# Patient Record
Sex: FEMALE | Race: WHITE | URBAN - METROPOLITAN AREA
[De-identification: names, ages, dates, MRNs, and addresses within clinical notes are randomized per-mention and may not be internally consistent; named-entity substitution may affect disease eponyms.]

---

## 2021-03-16 ENCOUNTER — OFFICE VISIT (OUTPATIENT)
Dept: ORTHOPEDIC SURGERY | Age: 66
End: 2021-03-16
Payer: MEDICARE

## 2021-03-16 VITALS — BODY MASS INDEX: 36.37 KG/M2 | HEIGHT: 68 IN | WEIGHT: 240 LBS

## 2021-03-16 DIAGNOSIS — M70.62 TROCHANTERIC BURSITIS OF LEFT HIP: Primary | ICD-10-CM

## 2021-03-16 DIAGNOSIS — M16.12 PRIMARY OSTEOARTHRITIS OF LEFT HIP: ICD-10-CM

## 2021-03-16 PROCEDURE — 1090F PRES/ABSN URINE INCON ASSESS: CPT | Performed by: ORTHOPAEDIC SURGERY

## 2021-03-16 PROCEDURE — G8419 CALC BMI OUT NRM PARAM NOF/U: HCPCS | Performed by: ORTHOPAEDIC SURGERY

## 2021-03-16 PROCEDURE — G8510 SCR DEP NEG, NO PLAN REQD: HCPCS | Performed by: ORTHOPAEDIC SURGERY

## 2021-03-16 PROCEDURE — 1101F PT FALLS ASSESS-DOCD LE1/YR: CPT | Performed by: ORTHOPAEDIC SURGERY

## 2021-03-16 PROCEDURE — G8536 NO DOC ELDER MAL SCRN: HCPCS | Performed by: ORTHOPAEDIC SURGERY

## 2021-03-16 PROCEDURE — G8427 DOCREV CUR MEDS BY ELIG CLIN: HCPCS | Performed by: ORTHOPAEDIC SURGERY

## 2021-03-16 PROCEDURE — 3017F COLORECTAL CA SCREEN DOC REV: CPT | Performed by: ORTHOPAEDIC SURGERY

## 2021-03-16 PROCEDURE — 99203 OFFICE O/P NEW LOW 30 MIN: CPT | Performed by: ORTHOPAEDIC SURGERY

## 2021-03-16 PROCEDURE — G8400 PT W/DXA NO RESULTS DOC: HCPCS | Performed by: ORTHOPAEDIC SURGERY

## 2021-03-16 PROCEDURE — 20611 DRAIN/INJ JOINT/BURSA W/US: CPT | Performed by: ORTHOPAEDIC SURGERY

## 2021-03-16 RX ORDER — METHYLPREDNISOLONE 4 MG/1
TABLET ORAL
Qty: 1 DOSE PACK | Refills: 0 | Status: SHIPPED | OUTPATIENT
Start: 2021-03-16 | End: 2021-09-21

## 2021-03-16 RX ORDER — TRIAMCINOLONE ACETONIDE 40 MG/ML
40 INJECTION, SUSPENSION INTRA-ARTICULAR; INTRAMUSCULAR ONCE
Status: COMPLETED | OUTPATIENT
Start: 2021-03-16 | End: 2021-03-16

## 2021-03-16 RX ORDER — LIDOCAINE HYDROCHLORIDE 10 MG/ML
9 INJECTION INFILTRATION; PERINEURAL ONCE
Status: COMPLETED | OUTPATIENT
Start: 2021-03-16 | End: 2021-03-16

## 2021-03-16 RX ADMIN — TRIAMCINOLONE ACETONIDE 40 MG: 40 INJECTION, SUSPENSION INTRA-ARTICULAR; INTRAMUSCULAR at 11:42

## 2021-03-16 RX ADMIN — LIDOCAINE HYDROCHLORIDE 9 ML: 10 INJECTION INFILTRATION; PERINEURAL at 11:42

## 2021-03-16 NOTE — LETTER
Jem Host 1955  
[id number]  
 
 
3/16/2021 I hereby authorize and direct Ihsan Mendez MD, Sabrina Emerson, and whomever he may designate as his associate to perform upon myself the following procedure: 
 
Injection of: Kenalog, Supartz, Euflexxa, Orthovisc in the Right/Left ____________________. If any unforeseen condition arises in the course of the procedure, I further authorize him and his associated and/or assistant(s) to do whatever he/she deems advisable. The nature, purpose, benefits, risks, side effects, likelihood of achieving goals, and potential problems that might occur during recuperation, risks for not receiving the proposed care, treatment and services and alternatives of the procedure have been fully explained to me by my physician including, but not limited to: 
 
Swelling, joint pain, skin pigment changes, worsening of condition, and failure to improve. I acknowledge that no guarantee or assurance has been made to me as to the results that may be obtained or the likelihood of success. _______________________________________ Signature of patient or authorized representative [employer] [facility] fax: [phone]

## 2021-03-16 NOTE — PATIENT INSTRUCTIONS
Hip Pain: Care Instructions Your Care Instructions Hip pain may be caused by many things, including overuse, a fall, or a twisting movement. Another cause of hip pain is arthritis. Your pain may increase when you stand up, walk, or squat. The pain may come and go or may be constant. Home treatment can help relieve hip pain, swelling, and stiffness. If your pain is ongoing, you may need more tests and treatment. Follow-up care is a key part of your treatment and safety. Be sure to make and go to all appointments, and call your doctor if you are having problems. It's also a good idea to know your test results and keep a list of the medicines you take. How can you care for yourself at home? · Take pain medicines exactly as directed. ? If the doctor gave you a prescription medicine for pain, take it as prescribed. ? If you are not taking a prescription pain medicine, ask your doctor if you can take an over-the-counter medicine. · Rest and protect your hip. Take a break from any activity, including standing or walking, that may cause pain. · Put ice or a cold pack against your hip for 10 to 20 minutes at a time. Try to do this every 1 to 2 hours for the next 3 days (when you are awake) or until the swelling goes down. Put a thin cloth between the ice and your skin. · Sleep on your healthy side with a pillow between your knees, or sleep on your back with pillows under your knees. · If there is no swelling, you can put moist heat, a heating pad, or a warm cloth on your hip. Do gentle stretching exercises to help keep your hip flexible. · Learn how to prevent falls. Have your vision and hearing checked regularly. Wear slippers or shoes with a nonskid sole. · Stay at a healthy weight. · Wear comfortable shoes. When should you call for help? Call 911 anytime you think you may need emergency care.  For example, call if: 
  · You have sudden chest pain and shortness of breath, or you cough up blood.  
  · You are not able to stand or walk or bear weight.  
  · Your buttocks, legs, or feet feel numb or tingly.  
  · Your leg or foot is cool or pale or changes color.  
  · You have severe pain. Call your doctor now or seek immediate medical care if: 
  · You have signs of infection, such as: 
? Increased pain, swelling, warmth, or redness in the hip area. ? Red streaks leading from the hip area. ? Pus draining from the hip area. ? A fever.  
  · You have signs of a blood clot, such as: 
? Pain in your calf, back of the knee, thigh, or groin. ? Redness and swelling in your leg or groin.  
  · You are not able to bend, straighten, or move your leg normally.  
  · You have trouble urinating or having bowel movements. Watch closely for changes in your health, and be sure to contact your doctor if: 
  · You do not get better as expected. Where can you learn more? Go to http://www.gray.com/ Enter I838 in the search box to learn more about \"Hip Pain: Care Instructions. \" Current as of: June 26, 2019               Content Version: 12.6 © 9301-7368 [employer]. Care instructions adapted under license by [employer] (which disclaims liability or warranty for this information). If you have questions about a medical condition or this instruction, always ask your healthcare professional. [place] [address] any warranty or liability for your use of this information.

## 2021-03-16 NOTE — PROGRESS NOTES
Name: Smiley Osei    : 1955     Service Dept: [facility]    Patient's Pharmacies:    [facility] [id number] [id number] [facility] [address]  Phone: [phone] Fax: [phone]       Chief Complaint   Patient presents with    Hip Pain    Leg Pain        Visit Vitals  Ht 5' 8\" (1.727 m)   Wt 240 lb (108.9 kg)   BMI 36.49 kg/m²      No Known Allergies   Current Outpatient Medications   Medication Sig Dispense Refill    methylPREDNISolone (MEDROL DOSEPACK) 4 mg tablet Per dose pack instructions 1 Dose Pack 0      There is no problem list on file for this patient. Family History   Problem Relation Age of Onset    No Known Problems Mother     Cancer Father       Social History     Socioeconomic History    Marital status:      Spouse name: Not on file    Number of children: Not on file    Years of education: Not on file    Highest education level: Not on file   Tobacco Use    Smoking status: Current Every Day Smoker     Packs/day: 1.00     Years: 10.00     Pack years: 10.00    Smokeless tobacco: Never Used   Substance and Sexual Activity    Alcohol use: Not Currently    Drug use: Never    Sexual activity: Not Currently      History reviewed. No pertinent surgical history. Past Medical History:   Diagnosis Date    Asthma     GERD (gastroesophageal reflux disease)     Lung disease         I have reviewed and agree with PFS and ROS and intake form in chart and the record furthermore I have reviewed prior medical record(s) regarding this patients care during this appointment. Review of Systems:   Patient is a pleasant appearing individual, appropriately dressed, well hydrated, well nourished, who is alert, appropriately oriented for age, and in no acute distress with a normal gait and normal affect who does not appear to be in any significant pain. Physical Exam:  Left Hip - Slight decrease range of motion, No crepitation, Grossly neurovascularly intact, Good cap refill, No skin lesion, mild swelling, No gross instability, Some weakness, No groin pain, Point tenderness on the greater trochanter. Right Hip - Normal range of motion, No crepitation, Grossly neurovascularly intact, Good cap refill, No skin lesion, mild swelling, No gross instability, No weakness, No groin pain, No point tenderness on the greater trochanter. Procedure Documentation:    I discussed in detail the risks, benefits and complications of an injection which included but are not limited to infection, skin reactions, hot swollen joint, and anaphylaxis with the patient. The patient verbalized understanding and gave informed consent for the injection. The patient's left hip was prepped using sterile alcohol solution. A sterile needle was inserted into the left hip and the mixture of 9 mL Lidocaine 1%, 1 mL Kenalog 40 mg was injected under sterile technique. The needle was withdrawn and the puncture site sealed with a Band-Aid. Technique: Under sterile conditions a Selvz ultrasound unit with a variable frequency (7.0-14.0 MHz) linear transducer was used to localize the placement of needle into the left hip. Findings: Successful needle placement for hip injection. Final images were taken and saved for permanent record. The patient tolerated the injection well. The patient was instructed to call the office immediately if there is any pain, redness, warmth, fever, or chills. Encounter Diagnoses     ICD-10-CM ICD-9-CM   1. Trochanteric bursitis of left hip  M70.62 726.5   2. Primary osteoarthritis of left hip  M16.12 715.15       HPI:  The patient is here with a chief complaint of left thigh and hip pain, been having it for a while now, progressively getting worse. Pain is 10/10. X-rays are positive for severe OA.     Assessment/Plan:  Plan would be for cortisone injection in left hip for the bursitis and Medrol Dosepak. We will see the patient back in 1 week. If not better, we may have her see a hip specialist.      As part of continued conservative pain management options the patient was advised to utilize Tylenol or OTC NSAIDS as long as it is not medically contraindicated. Return to Office: Follow-up and Dispositions    · Return in about 1 week (around 3/23/2021). Administrations This Visit     lidocaine (XYLOCAINE) 10 mg/mL (1 %) injection 9 mL     Admin Date  03/16/2021 Action  Given Dose  9 mL Route  Other Administered By  Selvin Basurto MD          triamcinolone acetonide (KENALOG-40) 40 mg/mL injection 40 mg     Admin Date  03/16/2021 Action  Given Dose  40 mg Route  Intra artICUlar Administered By  Selvin Basurto MD               Scribed by Melani Trejo MD as dictated by Frances Vallecillo. Madhu Ha MD.  Documentation True and Accepted Ihsan Ha MD

## 2021-09-21 ENCOUNTER — VIRTUAL VISIT (OUTPATIENT)
Dept: FAMILY MEDICINE CLINIC | Age: 66
End: 2021-09-21
Payer: MEDICARE

## 2021-09-21 DIAGNOSIS — J45.909 MODERATE ASTHMA WITHOUT COMPLICATION, UNSPECIFIED WHETHER PERSISTENT: Primary | ICD-10-CM

## 2021-09-21 PROCEDURE — 1090F PRES/ABSN URINE INCON ASSESS: CPT | Performed by: NURSE PRACTITIONER

## 2021-09-21 PROCEDURE — 1101F PT FALLS ASSESS-DOCD LE1/YR: CPT | Performed by: NURSE PRACTITIONER

## 2021-09-21 PROCEDURE — 99213 OFFICE O/P EST LOW 20 MIN: CPT | Performed by: NURSE PRACTITIONER

## 2021-09-21 PROCEDURE — G8536 NO DOC ELDER MAL SCRN: HCPCS | Performed by: NURSE PRACTITIONER

## 2021-09-21 PROCEDURE — G8400 PT W/DXA NO RESULTS DOC: HCPCS | Performed by: NURSE PRACTITIONER

## 2021-09-21 PROCEDURE — G8432 DEP SCR NOT DOC, RNG: HCPCS | Performed by: NURSE PRACTITIONER

## 2021-09-21 PROCEDURE — 3017F COLORECTAL CA SCREEN DOC REV: CPT | Performed by: NURSE PRACTITIONER

## 2021-09-21 PROCEDURE — G8427 DOCREV CUR MEDS BY ELIG CLIN: HCPCS | Performed by: NURSE PRACTITIONER

## 2021-09-21 PROCEDURE — G8417 CALC BMI ABV UP PARAM F/U: HCPCS | Performed by: NURSE PRACTITIONER

## 2021-09-21 RX ORDER — ALBUTEROL SULFATE 90 UG/1
2 AEROSOL, METERED RESPIRATORY (INHALATION)
Qty: 18 G | Refills: 1 | Status: SHIPPED | OUTPATIENT
Start: 2021-09-21 | End: 2021-10-21

## 2021-09-21 NOTE — PROGRESS NOTES
Oscar Villeda presents today for   Chief Complaint   Patient presents with    Medication Refill    Establish Care     patient was on the schedule for in office bronchitis, I went out and spoke to the patient in the waiting area and asked her symptoms and she told me she had to be honest with me that someone she had been around has covid and she had several symptom of covid and was then escorted out and advised to call [facility] (her request) and schedule a covid test ASAP, I had erickson put her on the schedule as a virtual at 1:00 but when I called her she did not answer. Called at 4:35 and she answered    Wheezing     she wanted an inhaler is why she came to the office she said.  Cough    Nasal Congestion       Virtual/telephone visit    Depression Screening:  3 most recent PHQ Screens 3/16/2021   Little interest or pleasure in doing things Not at all   Feeling down, depressed, irritable, or hopeless Not at all   Total Score PHQ 2 0       Learning Assessment:  Learning Assessment 3/16/2021   PRIMARY LEARNER Patient   HIGHEST LEVEL OF EDUCATION - PRIMARY LEARNER  SOME COLLEGE   PRIMARY LANGUAGE ENGLISH   LEARNER PREFERENCE PRIMARY LISTENING   LEARNING SPECIAL TOPICS no   ANSWERED BY self   RELATIONSHIP SELF       Fall Risk  No flowsheet data found. ADL  No flowsheet data found. Travel Screening:    Travel Screening      No screening recorded since 09/20/21 0000      Travel History   Travel since 08/21/21     No documented travel since 08/21/21          Health Maintenance reviewed and discussed and ordered per Provider.     Health Maintenance Due   Topic Date Due    Hepatitis C Screening  Never done    COVID-19 Vaccine (1) Never done    DTaP/Tdap/Td series (1 - Tdap) Never done    Lipid Screen  Never done    Colorectal Cancer Screening Combo  Never done    Shingrix Vaccine Age 50> (1 of 2) Never done    Breast Cancer Screen Mammogram  Never done    Bone Densitometry (Dexa) Screening  Never done  Pneumococcal 65+ years (1 of 1 - PPSV23) Never done    Medicare Yearly Exam  Never done    Flu Vaccine (1) Never done   . Coordination of Care:  1. Have you been to the ER, urgent care clinic since your last visit? Hospitalized since your last visit? no    2. Have you seen or consulted any other health care providers outside of the [address] [place] [address] since your last visit? Include any pap smears or colon screening.  no

## 2021-09-21 NOTE — PROGRESS NOTES
Ivory Garcia is a 77 y.o. female, evaluated via audio-only technology on 9/21/2021 for Medication Refill, Establish Care (patient was on the schedule for in office bronchitis, I went out and spoke to the patient in the waiting area and asked her symptoms and she told me she had to be honest with me that someone she had been around has covid and she had several symptom of covid and was then escorted out and advised to call [facility] (her request) and schedule a covid test ASAP, I had erickson put her on the schedule as a virtual at 1:00 but when I called her she did not answer. Called at 4:35 and she answered), Wheezing (she wanted an inhaler is why she came to the office she said. ), Cough, and Nasal Congestion  . Assessment & Plan:   1. Asthma. Prescribed Albuterol 90 mcg/actuation inhaler 2 puffs Q4 HRS PRN for SOB/Wheezing. 12  Subjective:   Patient reports nasal congestion and malaise with an approximate 2-week history. The patient reports she is trying to get COVID tested. Patient has been diagnosed with asthma. Patient denies fevers, diarrhea and headaches. Patient does report wheezing, but not an increase within the last two weeks. Patient reports history of cough related to asthma, but has not observed an increase in cough within the last 2 weeks. Patient was educated to report emergency room if she experiences increased shortness of breath or temperature greater than 101. Prior to Admission medications    Medication Sig Start Date End Date Taking? Authorizing Provider   methylPREDNISolone (MEDROL DOSEPACK) 4 mg tablet Per dose pack instructions 3/16/21 9/21/21  Sulma Ramos MD     Ivory Garcia, who was evaluated through a patient-initiated, synchronous (real-time) audio only encounter, and/or her healthcare decision maker, is aware that it is a billable service, with coverage as determined by her insurance carrier. She provided verbal consent to proceed: Yes.  She has not had a related appointment within my department in the past 7 days or scheduled within the next 24 hours.     Alaina Elizabeth NP

## 2021-10-13 DIAGNOSIS — J45.909 MODERATE ASTHMA WITHOUT COMPLICATION, UNSPECIFIED WHETHER PERSISTENT: ICD-10-CM

## 2021-10-21 RX ORDER — ALBUTEROL SULFATE 90 UG/1
AEROSOL, METERED RESPIRATORY (INHALATION)
Qty: 18 G | Refills: 1 | Status: SHIPPED | OUTPATIENT
Start: 2021-10-21 | End: 2022-02-04 | Stop reason: SDUPTHER

## 2022-02-04 DIAGNOSIS — J45.909 MODERATE ASTHMA WITHOUT COMPLICATION, UNSPECIFIED WHETHER PERSISTENT: ICD-10-CM

## 2022-02-04 RX ORDER — ALBUTEROL SULFATE 90 UG/1
AEROSOL, METERED RESPIRATORY (INHALATION)
Qty: 18 G | Refills: 1 | Status: SHIPPED | OUTPATIENT
Start: 2022-02-04 | End: 2022-07-29 | Stop reason: SDUPTHER

## 2022-07-29 DIAGNOSIS — J45.909 MODERATE ASTHMA WITHOUT COMPLICATION, UNSPECIFIED WHETHER PERSISTENT: ICD-10-CM

## 2022-07-29 RX ORDER — ALBUTEROL SULFATE 90 UG/1
AEROSOL, METERED RESPIRATORY (INHALATION)
Qty: 18 G | Refills: 1 | Status: SHIPPED | OUTPATIENT
Start: 2022-07-29 | End: 2022-10-03

## 2022-10-03 ENCOUNTER — OFFICE VISIT (OUTPATIENT)
Dept: FAMILY MEDICINE CLINIC | Age: 67
End: 2022-10-03
Payer: MEDICARE

## 2022-10-03 VITALS
BODY MASS INDEX: 35.46 KG/M2 | OXYGEN SATURATION: 91 % | WEIGHT: 234 LBS | TEMPERATURE: 97.7 F | DIASTOLIC BLOOD PRESSURE: 100 MMHG | SYSTOLIC BLOOD PRESSURE: 173 MMHG | HEIGHT: 68 IN | HEART RATE: 88 BPM

## 2022-10-03 DIAGNOSIS — J45.40 MODERATE PERSISTENT ASTHMA WITHOUT COMPLICATION: ICD-10-CM

## 2022-10-03 DIAGNOSIS — I10 UNCONTROLLED HYPERTENSION: ICD-10-CM

## 2022-10-03 DIAGNOSIS — F17.200 NICOTINE USE DISORDER: ICD-10-CM

## 2022-10-03 DIAGNOSIS — Z11.59 NEED FOR HEPATITIS C SCREENING TEST: ICD-10-CM

## 2022-10-03 DIAGNOSIS — Z00.00 MEDICARE ANNUAL WELLNESS VISIT, SUBSEQUENT: Primary | ICD-10-CM

## 2022-10-03 DIAGNOSIS — E66.9 OBESITY, CLASS II, BMI 35-39.9: ICD-10-CM

## 2022-10-03 DIAGNOSIS — Z13.31 SCREENING FOR DEPRESSION: ICD-10-CM

## 2022-10-03 DIAGNOSIS — Z13.39 SCREENING FOR ALCOHOLISM: ICD-10-CM

## 2022-10-03 PROCEDURE — 99214 OFFICE O/P EST MOD 30 MIN: CPT | Performed by: STUDENT IN AN ORGANIZED HEALTH CARE EDUCATION/TRAINING PROGRAM

## 2022-10-03 PROCEDURE — 1123F ACP DISCUSS/DSCN MKR DOCD: CPT | Performed by: STUDENT IN AN ORGANIZED HEALTH CARE EDUCATION/TRAINING PROGRAM

## 2022-10-03 PROCEDURE — G0439 PPPS, SUBSEQ VISIT: HCPCS | Performed by: STUDENT IN AN ORGANIZED HEALTH CARE EDUCATION/TRAINING PROGRAM

## 2022-10-03 RX ORDER — AMLODIPINE BESYLATE 5 MG/1
5 TABLET ORAL DAILY
Qty: 30 TABLET | Refills: 0 | Status: SHIPPED | OUTPATIENT
Start: 2022-10-03 | End: 2022-10-03

## 2022-10-03 RX ORDER — BUDESONIDE AND FORMOTEROL FUMARATE DIHYDRATE 160; 4.5 UG/1; UG/1
2 AEROSOL RESPIRATORY (INHALATION) 2 TIMES DAILY
Qty: 10.2 G | Refills: 2 | Status: SHIPPED | OUTPATIENT
Start: 2022-10-03

## 2022-10-03 RX ORDER — AMLODIPINE BESYLATE 5 MG/1
TABLET ORAL
Qty: 90 TABLET | Refills: 1 | Status: SHIPPED | OUTPATIENT
Start: 2022-10-03 | End: 2022-10-06

## 2022-10-03 NOTE — PATIENT INSTRUCTIONS
Medicare Wellness Visit, Female     The best way to live healthy is to have a lifestyle where you eat a well-balanced diet, exercise regularly, limit alcohol use, and quit all forms of tobacco/nicotine, if applicable. Regular preventive services are another way to keep healthy. Preventive services (vaccines, screening tests, monitoring & exams) can help personalize your care plan, which helps you manage your own care. Screening tests can find health problems at the earliest stages, when they are easiest to treat. [facility] follows the current, evidence-based guidelines published by the [facility] [employer] ([facility]) when recommending preventive services for our patients. Because we follow these guidelines, sometimes recommendations change over time as research supports it. (For example, mammograms used to be recommended annually. Even though Medicare will still pay for an annual mammogram, the newer guidelines recommend a mammogram every two years for women of average risk). Of course, you and your doctor may decide to screen more often for some diseases, based on your risk and your co-morbidities (chronic disease you are already diagnosed with). Preventive services for you include:  - Medicare offers their members a free annual wellness visit, which is time for you and your primary care provider to discuss and plan for your preventive service needs. Take advantage of this benefit every year!  -All adults over the age of 72 should receive the recommended pneumonia vaccines. Current USPSTF guidelines recommend a series of two vaccines for the best pneumonia protection.   -All adults should have a flu vaccine yearly and a tetanus vaccine every 10 years.   -All adults age 48 and older should receive the shingles vaccines (series of two vaccines).       -All adults age 38-68 who are overweight should have a diabetes screening test once every three years.   -All adults born between [birth year] and 1965 should be screened once for Hepatitis C.  -Other screening tests and preventive services for persons with diabetes include: an eye exam to screen for diabetic retinopathy, a kidney function test, a foot exam, and stricter control over your cholesterol.   -Cardiovascular screening for adults with routine risk involves an electrocardiogram (ECG) at intervals determined by your doctor.   -Colorectal cancer screenings should be done for adults age 54-65 with no increased risk factors for colorectal cancer. There are a number of acceptable methods of screening for this type of cancer. Each test has its own benefits and drawbacks. Discuss with your doctor what is most appropriate for you during your annual wellness visit. The different tests include: colonoscopy (considered the best screening method), a fecal occult blood test, a fecal DNA test, and sigmoidoscopy.    -A bone mass density test is recommended when a woman turns 65 to screen for osteoporosis. This test is only recommended one time, as a screening. Some providers will use this same test as a disease monitoring tool if you already have osteoporosis. -Breast cancer screenings are recommended every other year for women of normal risk, age 54-69.  -Cervical cancer screenings for women over age 72 are only recommended with certain risk factors.      Here is a list of your current Health Maintenance items (your personalized list of preventive services) with a due date:  Health Maintenance Due   Topic Date Due    Hepatitis C Test  Never done    COVID-19 Vaccine (1) Never done    Pneumococcal Vaccine (1 - PCV) Never done    DTaP/Tdap/Td  (1 - Tdap) Never done    Cholesterol Test   Never done    Colorectal Screening  Never done    Shingles Vaccine (1 of 2) Never done    Mammogram  Never done    Bone Mineral Density   Never done    Depresssion Screening  03/16/2022    Yearly Flu Vaccine (1) Never done

## 2022-10-03 NOTE — PROGRESS NOTES
Ute Nicholas presents today for   Chief Complaint   Patient presents with    Annual Wellness Visit       Is someone accompanying this pt? No     Is the patient using any DME equipment during OV? She is using a wheelchair as a walker    Depression Screening:  3 most recent [address] 10/3/2022   PHQ Not Done Patient refuses   Little interest or pleasure in doing things -   Feeling down, depressed, irritable, or hopeless -   Total Score PHQ 2 -       Learning Assessment:  Learning Assessment 3/16/2021   PRIMARY LEARNER Patient   HIGHEST LEVEL OF EDUCATION - PRIMARY LEARNER  SOME COLLEGE   PRIMARY LANGUAGE ENGLISH   LEARNER PREFERENCE PRIMARY LISTENING   LEARNING SPECIAL TOPICS no   ANSWERED BY self   RELATIONSHIP SELF       Fall Risk  Fall Risk Assessment, last 12 mths 10/3/2022   Able to walk? Yes   Fall in past 12 months? 0   Do you feel unsteady? 1   Are you worried about falling 1       ADL  No flowsheet data found. Health Maintenance reviewed and discussed and ordered per Provider. Health Maintenance Due   Topic Date Due    Hepatitis C Screening  Never done    COVID-19 Vaccine (1) Never done    Pneumococcal 65+ years (1 - PCV) Never done    DTaP/Tdap/Td series (1 - Tdap) Never done    Lipid Screen  Never done    Colorectal Cancer Screening Combo  Never done    Shingrix Vaccine Age 50> (1 of 2) Never done    Breast Cancer Screen Mammogram  Never done    Bone Densitometry (Dexa) Screening  Never done    Depression Screen  03/16/2022    Flu Vaccine (1) Never done   . Coordination of Care:  1. \"Have you been to the ER, urgent care clinic since your last visit? Hospitalized since your last visit? \" No    2. \"Have you seen or consulted any other health care providers outside of the [address] [place] [address] since your last visit? \" No     3. For patients aged 39-70: Has the patient had a colonoscopy? No     If the patient is female:    4.  For patients aged 41-77: Has the patient had a mammogram within the past 2 years? No    5. For patients aged 21-65: Has the patient had a pap smear? NA - based on age    This is the Subsequent Medicare Annual Wellness Exam, performed 12 months or more after the Initial AWV or the last Subsequent AWV    I have reviewed the patient's medical history in detail and updated the computerized patient record. Assessment/Plan   Education and counseling provided:  Are appropriate based on today's review and evaluation    1. Medicare annual wellness visit, subsequent  2. Screening for alcoholism  -     [place] ANNUAL ALCOHOL SCREEN 15 MIN  3. Screening for depression  -     DEPRESSION SCREEN ANNUAL  4. Uncontrolled hypertension  -     CBC WITH AUTOMATED DIFF; Future  -     METABOLIC PANEL, COMPREHENSIVE; Future  -     amLODIPine (NORVASC) 5 mg tablet; Take 1 Tablet by mouth daily. Indications: high blood pressure, Normal, Disp-30 Tablet, R-0  5. Moderate persistent asthma without complication  -     budesonide-formoteroL (SYMBICORT) 160-4.5 mcg/actuation HFAA; Take 2 Puffs by inhalation two (2) times a day. Indications: controller medication for asthma, Normal, Disp-10.2 g, R-2  6. Nicotine use disorder  7. Need for hepatitis C screening test  -     HEPATITIS C AB; Future  8. Obesity, Class II, BMI 35-39.9  -     LIPID PANEL; Future  -     HEMOGLOBIN A1C WITH EAG; Future     I have reviewed recommended screening guidelines with patient, however patient was very overwhelmed her appointment today. Will work on uncontrolled hypertension as well as uncontrolled asthma at this time. Will [place] back to breast cancer screening, colon, DEXA scan and vaccinations in the future, but is willing to start with lab work. We will have her come back in 2 weeks.     Depression Risk Factor Screening     3 most recent PHQ Screens 10/3/2022   PHQ Not Done Patient refuses   Little interest or pleasure in doing things -   Feeling down, depressed, irritable, or hopeless -   Total Score PHQ 2 -       Alcohol & Drug Abuse Risk Screen    Do you average more than 1 drink per night or more than 7 drinks a week:  No    On any one occasion in the past three months have you have had more than 3 drinks containing alcohol:  No          Functional Ability and Level of Safety    Hearing: Hearing is good. Activities of Daily Living: The home contains: no safety equipment. Patient does total self care      Ambulation: with no difficulty     Fall Risk:  Fall Risk Assessment, last 12 mths 10/3/2022   Able to walk? Yes   Fall in past 12 months? 0   Do you feel unsteady? 1   Are you worried about falling 1      Abuse Screen:  Patient is not abused       Cognitive Screening    Has your family/caregiver stated any concerns about your memory: no         Health Maintenance Due     Health Maintenance Due   Topic Date Due    Hepatitis C Screening  Never done    COVID-19 Vaccine (1) Never done    Pneumococcal 65+ years (1 - PCV) Never done    DTaP/Tdap/Td series (1 - Tdap) Never done    Lipid Screen  Never done    Colorectal Cancer Screening Combo  Never done    Shingrix Vaccine Age 50> (1 of 2) Never done    Breast Cancer Screen Mammogram  Never done    Bone Densitometry (Dexa) Screening  Never done    Depression Screen  03/16/2022    Flu Vaccine (1) Never done       Patient Care Team   Patient Care Team:  Chrissy Sanchez NP as PCP - General (Nurse Practitioner)  Chrissy Sanchez NP as PCP - [facility] Empaneled Provider  Asia Donaldson MD as Consulting Provider (Orthopedic Surgery)    History   There is no problem list on file for this patient. Past Medical History:   Diagnosis Date    Asthma     GERD (gastroesophageal reflux disease)     Lung disease       History reviewed. No pertinent surgical history. Current Outpatient Medications   Medication Sig Dispense Refill    budesonide-formoteroL (SYMBICORT) 160-4.5 mcg/actuation HFAA Take 2 Puffs by inhalation two (2) times a day.  Indications: controller medication for asthma 10.2 g 2    amLODIPine (NORVASC) 5 mg tablet Take 1 Tablet by mouth daily. Indications: high blood pressure 30 Tablet 0     No Known Allergies    Family History   Problem Relation Age of Onset    No Known Problems Mother     Cancer Father      Social History     Tobacco Use    Smoking status: Every Day     Packs/day: 1.00     Years: 10.00     Pack years: 10.00     Types: Cigarettes    Smokeless tobacco: Never   Substance Use Topics    Alcohol use: Not Currently         [employer]       I have independently reviewed and evaluated the above conditions and discussed screening guidelines with patient.   Signed By: Rita Larry MD     October 3, 2022

## 2022-10-03 NOTE — PROGRESS NOTES
Subjective:   Yahir Saez is a 74 y.o. female who was seen for Annual Wellness Visit    Patient initially here for annual Medicare wellness visit, however is very overwhelmed at multiple medical conditions. Has not seen a doctor in years. Reports she has a history of asthma and needs refill on Ventolin inhaler. Reports she takes this every day. Has been on Symbicort in the past.  Does not remember if she has had PFT testing. Patient also reports that her blood pressure has intermittently been high but is always self resolved. Reports she is very frustrated and worse today due to situational circumstances. Currently not taking any other medications other than Ventolin. See separate Medicare wellness visit note. Home Medications    Medication Sig Start Date End Date Taking? Authorizing Provider   budesonide-formoteroL (SYMBICORT) 160-4.5 mcg/actuation HFAA Take 2 Puffs by inhalation two (2) times a day. Indications: controller medication for asthma 10/3/22  Yes Fly Jennings MD   amLODIPine (NORVASC) 5 mg tablet Take 1 Tablet by mouth daily. Indications: high blood pressure 10/3/22  Yes Fly Jennings MD   albuterol (PROVENTIL HFA, VENTOLIN HFA, PROAIR HFA) 90 mcg/actuation inhaler INHALE 2 PUFFS BY MOUTH EVERY 4 TO 6 HOURS AS NEEDED 7/29/22 10/3/22  Stoney MONTES NP      No Known Allergies  Social History     Tobacco Use    Smoking status: Every Day     Packs/day: 1.00     Years: 10.00     Pack years: 10.00     Types: Cigarettes    Smokeless tobacco: Never   Vaping Use    Vaping Use: Never used   Substance Use Topics    Alcohol use: Not Currently    Drug use: Never        Review of Systems   As noted above in HPI. Objective:   Visit Vitals  BP (!) 173/100 (BP 1 Location: Left upper arm, BP Patient Position: Sitting, BP Cuff Size: Large adult long) Comment: patient refuses retake.    Pulse 88   Temp 97.7 °F (36.5 °C) (Temporal)   Ht 5' 8\" (1.727 m)   Wt 234 lb (106.1 kg) SpO2 91%   BMI 35.58 kg/m²        General: alert, oriented, not in distress  Chest/Lungs: clear breath sounds, no wheezing or crackles  Heart: normal rate, regular rhythm, no murmur  Extremities: no focal deformities, no edema  Skin: no active skin lesions      Assessment & Plan:     1. Medicare annual wellness visit, subsequent  See separate nursing note. 2. Screening for alcoholism  - [place] ANNUAL ALCOHOL SCREEN 15 MIN    3. Screening for depression  Patient refused screening.  - DEPRESSION SCREEN ANNUAL    4. Uncontrolled hypertension  Patient with uncontrolled hypertension. Reports she has had previous abnormalities in the past that have always self resolved. Patient is not following a low-salt diet and not exercising. Would recommend that she follow a Dash diet and get at least 150 minutes of moderate intensity activity a day. Will start amlodipine 5 mg daily. Discussed risks and benefits. We will check lab work as she may benefit from alternative blood pressure medications at this time. Educated that obesity can contribute to high blood pressure and she should work on weight loss. - CBC WITH AUTOMATED DIFF; Future  - METABOLIC PANEL, COMPREHENSIVE; Future  - amLODIPine (NORVASC) 5 mg tablet; Take 1 Tablet by mouth daily. Indications: high blood pressure  Dispense: 30 Tablet; Refill: 0    5. Moderate persistent asthma without complication  Patient with moderate persistent asthma. Currently using albuterol daily especially at night. She is unsure if she has had PFTs in the past.  Has been on Symbicort in the past and noticed great improvement in her symptoms. Discussed risks and benefits of alternative therapies to include Symbicort over Ventolin. We will start Symbicort at least twice a day for maintenance can also use as rescue as needed. - budesonide-formoteroL (SYMBICORT) 160-4.5 mcg/actuation HFAA; Take 2 Puffs by inhalation two (2) times a day.  Indications: controller medication for asthma Dispense: 10.2 g; Refill: 2    6. Nicotine use disorder  Patient with current nicotine use disorder. Smokes over 2 packs a day. Has quit in the past for about 3 years without medications. Patient is interested in quitting and therapies however is very hesitant to try too many things at once. Would recommend that she work on asthma and blood pressure control at this time. Will provide handout on smoking cessation methods to discuss more in the future. 7. Need for hepatitis C screening test  To  - HEPATITIS C AB; Future    8. Obesity, Class II, BMI 35-39.9  Discussed healthy diet and exercise. Patient reports issues affording medications, healthcare, food insecurity. Also has housing and transportation insecurities. - LIPID PANEL; Future  - HEMOGLOBIN A1C WITH EAG; Future       Follow-up and Dispositions    Return in about 2 weeks (around 10/17/2022). I have discussed the diagnosis with the patient and the intended plan as seen in the above orders. The patient has received an after-visit summary and questions were answered concerning future plans. I have discussed medication side effects and warnings with the patient as well. I have reviewed the plan of care with the patient, accepted their input and they are in agreement with the treatment goals. Previous lab and imaging results were reviewed by me.        Thomas Stubbs MD  October 3, 2022

## 2022-10-06 ENCOUNTER — TELEPHONE (OUTPATIENT)
Dept: FAMILY MEDICINE CLINIC | Age: 67
End: 2022-10-06

## 2022-10-06 RX ORDER — LOSARTAN POTASSIUM 50 MG/1
50 TABLET ORAL DAILY
Qty: 90 TABLET | Refills: 0 | Status: SHIPPED | OUTPATIENT
Start: 2022-10-06

## 2022-10-06 NOTE — TELEPHONE ENCOUNTER
Discussed allergy to amlodipine with patient. Symbicort requires prior authorization. Will try to work on prior authorization. Reports she has an allergy to amlodipine. Will try losartan. No baseline creatinine noted in chart. Urged patient to get labwork. Wants to go to [place] to have this done. Asked her to please send results to us or bring a printout of the results to her next appointment.

## 2022-10-06 NOTE — TELEPHONE ENCOUNTER
Patient says she is allergic to amlodipine she told Dr. Pamela Felix wrong. Shannon also told her she needed a pre auth for her symbicort. Patient states she needs another blood pressure med because her blood pressure is high.

## 2022-10-31 ENCOUNTER — DOCUMENTATION ONLY (OUTPATIENT)
Dept: FAMILY MEDICINE CLINIC | Age: 67
End: 2022-10-31

## 2022-10-31 NOTE — PROGRESS NOTES
Reviewed lab results. They show patient has kidney disease, likely chronic CKD stage 3a and prediabetes. Called to discuss this with patient, however voicemail not set up yet. Patient has an appointment on 11/10. Will discuss this further at that appointment. Patient is allergic to amlodipine. She was started on losartan last visit.

## 2022-11-10 ENCOUNTER — OFFICE VISIT (OUTPATIENT)
Dept: FAMILY MEDICINE CLINIC | Age: 67
End: 2022-11-10
Payer: MEDICARE

## 2022-11-10 VITALS
HEART RATE: 89 BPM | SYSTOLIC BLOOD PRESSURE: 136 MMHG | OXYGEN SATURATION: 92 % | DIASTOLIC BLOOD PRESSURE: 86 MMHG | WEIGHT: 233.2 LBS | TEMPERATURE: 97.9 F | HEIGHT: 68 IN | BODY MASS INDEX: 35.34 KG/M2

## 2022-11-10 DIAGNOSIS — E78.5 HYPERLIPIDEMIA, UNSPECIFIED HYPERLIPIDEMIA TYPE: ICD-10-CM

## 2022-11-10 DIAGNOSIS — E66.01 SEVERE OBESITY (BMI 35.0-39.9) WITH COMORBIDITY (HCC): ICD-10-CM

## 2022-11-10 DIAGNOSIS — R73.03 PREDIABETES: ICD-10-CM

## 2022-11-10 DIAGNOSIS — F17.200 NICOTINE USE DISORDER: ICD-10-CM

## 2022-11-10 DIAGNOSIS — I10 PRIMARY HYPERTENSION: Primary | ICD-10-CM

## 2022-11-10 DIAGNOSIS — J44.9 ASTHMA-COPD OVERLAP SYNDROME (HCC): ICD-10-CM

## 2022-11-10 DIAGNOSIS — N18.31 STAGE 3A CHRONIC KIDNEY DISEASE (HCC): ICD-10-CM

## 2022-11-10 PROBLEM — J44.89 ASTHMA-COPD OVERLAP SYNDROME (HCC): Status: ACTIVE | Noted: 2022-11-10

## 2022-11-10 PROCEDURE — 3075F SYST BP GE 130 - 139MM HG: CPT | Performed by: STUDENT IN AN ORGANIZED HEALTH CARE EDUCATION/TRAINING PROGRAM

## 2022-11-10 PROCEDURE — 1123F ACP DISCUSS/DSCN MKR DOCD: CPT | Performed by: STUDENT IN AN ORGANIZED HEALTH CARE EDUCATION/TRAINING PROGRAM

## 2022-11-10 PROCEDURE — 99214 OFFICE O/P EST MOD 30 MIN: CPT | Performed by: STUDENT IN AN ORGANIZED HEALTH CARE EDUCATION/TRAINING PROGRAM

## 2022-11-10 PROCEDURE — 3079F DIAST BP 80-89 MM HG: CPT | Performed by: STUDENT IN AN ORGANIZED HEALTH CARE EDUCATION/TRAINING PROGRAM

## 2022-11-10 RX ORDER — ALBUTEROL SULFATE 90 UG/1
2 AEROSOL, METERED RESPIRATORY (INHALATION)
Qty: 18 G | Refills: 1 | Status: SHIPPED | OUTPATIENT
Start: 2022-11-10

## 2022-11-10 RX ORDER — FLUTICASONE PROPIONATE AND SALMETEROL XINAFOATE 230; 21 UG/1; UG/1
2 AEROSOL, METERED RESPIRATORY (INHALATION) 2 TIMES DAILY
Qty: 12 G | Refills: 2 | Status: SHIPPED | OUTPATIENT
Start: 2022-11-10

## 2022-11-10 RX ORDER — AMLODIPINE BESYLATE 5 MG/1
5 TABLET ORAL DAILY
COMMUNITY

## 2022-11-10 NOTE — PATIENT INSTRUCTIONS
Take advair 2 puffs twice per day all the time. Can take albuterol/ ventolin every 4 hours as needed for sob and wheezing.

## 2022-11-10 NOTE — PROGRESS NOTES
Subjective:   Harmeet Nagy is a 74 y.o. female who was seen for Follow-up (Blood pressure/Lab results/)    Here to follow up with elevated bp. Reports that she has been taking amlodipine 5 mg daily. Does not follow low salt diet. Does not exercise. Reports that she has a h/o of asthma and copd. She is a current smoker. Spiriva not covered by her insurance. Letter shows that advair is covered. Reports ICS has helped in the past.    HLD:  - elevated LDL, ASCVD risk score 9.5%        Home Medications    Medication Sig Start Date End Date Taking? Authorizing Provider   amLODIPine (NORVASC) 5 mg tablet Take 5 mg by mouth daily. Yes Provider, Historical   losartan (COZAAR) 50 mg tablet Take 1 Tablet by mouth daily. Indications: high blood pressure  Patient not taking: Reported on 11/10/2022 10/6/22   Cheryl Amaya MD   budesonide-formoteroL [facility]) 160-4.5 mcg/actuation HFAA Take 2 Puffs by inhalation two (2) times a day. Indications: controller medication for asthma  Patient not taking: Reported on 11/10/2022 10/3/22   Cheryl Amaya MD      Allergies   Allergen Reactions    Amlodipine Shortness of Breath     Social History     Tobacco Use    Smoking status: Every Day     Packs/day: 1.00     Years: 10.00     Pack years: 10.00     Types: Cigarettes    Smokeless tobacco: Never   Vaping Use    Vaping Use: Never used   Substance Use Topics    Alcohol use: Not Currently    Drug use: Never        Review of Systems   As noted above in HPI.       Objective:   Visit Vitals  /86 (BP 1 Location: Left upper arm, BP Patient Position: Sitting, BP Cuff Size: Large adult long)   Pulse 89   Temp 97.9 °F (36.6 °C) (Temporal)   Ht 5' 8\" (1.727 m)   Wt 233 lb 3.2 oz (105.8 kg)   SpO2 92%   BMI 35.46 kg/m²        General: alert, oriented, not in distress  Chest/Lungs: clear breath sounds, no wheezing or crackles  Heart: normal rate, regular rhythm, no murmur  Extremities: no focal deformities, no edema  Skin: no active skin lesions      Assessment & Plan:     1. Primary hypertension, CKD 3a  Controlled. Goal <140/90 given CKD stage 3a    2. Severe obesity (BMI 35.0-39. 9) with comorbidity ([facility] [address]  Has lost a lb since last visit. Discussed healthy diet and exercise. 3. Asthma-COPD overlap syndrome ([facility]  Will start ICS as this has greatly helped her in the past. Really needs PFT. Discussed risks and benefits. Will order  - fluticasone propion-salmeteroL (Advair HFA) 230-21 mcg/actuation inhaler; Take 2 Puffs by inhalation two (2) times a day. Indications: bronchospasm prevention with COPD  Dispense: 12 g; Refill: 2  - albuterol (PROVENTIL HFA, VENTOLIN HFA, PROAIR HFA) 90 mcg/actuation inhaler; Take 2 Puffs by inhalation every four (4) hours as needed for Wheezing. Indications: chronic obstructive pulmonary disease  Dispense: 18 g; Refill: 1  - PULMONARY FUNCTION TEST; Future    4. Prediabetes  HgbA1C 5.9. Given diabetic diet. Would encourage her to follow-up. Can consider metformin in the future if lifestyle modifications are ineffective. 5. Nicotine use disorder  Patient currently smoking. Not interested in quitting at this time      6. Stage 3a chronic kidney disease (HCC)  Stable. Will continue to monitor    7. Hyperlipidemia, unspecified hyperlipidemia type  Stable. ASCVD score 9.5%. Discussed risks and benefits of starting cholesterol medications. Given shared decision-making, will try lifestyle modifications first.    Follow-up and Dispositions    Return in about 3 months (around 2/10/2023). I have discussed the diagnosis with the patient and the intended plan as seen in the above orders. The patient has received an after-visit summary and questions were answered concerning future plans. I have discussed medication side effects and warnings with the patient as well. I have reviewed the plan of care with the patient, accepted their input and they are in agreement with the treatment goals. Previous lab and imaging results were reviewed by me.        Talia Driscoll MD  November 10, 2022

## 2022-11-10 NOTE — PROGRESS NOTES
Neema Jay presents today for   Chief Complaint   Patient presents with    Follow-up     Blood pressure  Lab results         Is someone accompanying this pt? No     Is the patient using any DME equipment during OV? wheelchair    Depression Screening:  3 most recent PHQ Screens 11/10/2022   PHQ Not Done -   Little interest or pleasure in doing things Not at all   Feeling down, depressed, irritable, or hopeless Several days   Total Score PHQ 2 1       Learning Assessment:  Learning Assessment 3/16/2021   PRIMARY LEARNER Patient   HIGHEST LEVEL OF EDUCATION - PRIMARY LEARNER  SOME COLLEGE   PRIMARY LANGUAGE ENGLISH   LEARNER PREFERENCE PRIMARY LISTENING   LEARNING SPECIAL TOPICS no   ANSWERED BY self   RELATIONSHIP SELF       Fall Risk  Fall Risk Assessment, last 12 mths 11/10/2022   Able to walk? Yes   Fall in past 12 months? 0   Do you feel unsteady? 1   Are you worried about falling 1       ADL  No flowsheet data found. Health Maintenance reviewed and discussed and ordered per Provider. Health Maintenance Due   Topic Date Due    COVID-19 Vaccine (1) Never done    Pneumococcal 65+ years (1 - PCV) Never done    DTaP/Tdap/Td series (1 - Tdap) Never done    Colorectal Cancer Screening Combo  Never done    Shingrix Vaccine Age 50> (1 of 2) Never done    Breast Cancer Screen Mammogram  Never done    Bone Densitometry (Dexa) Screening  Never done    Depression Screen  03/16/2022    Flu Vaccine (1) Never done   . Coordination of Care:  1. \"Have you been to the ER, urgent care clinic since your last visit? Hospitalized since your last visit? \" No    2. \"Have you seen or consulted any other health care providers outside of the [address] [place] [address] since your last visit? \" No     3. For patients aged 39-70: Has the patient had a colonoscopy? No     If the patient is female:    4. For patients aged 41-77: Has the patient had a mammogram within the past 2 years? No    5.  For patients aged 21-65: Has the patient had a pap smear?  NA - based on age

## 2023-01-06 DIAGNOSIS — J44.9 ASTHMA-COPD OVERLAP SYNDROME (HCC): ICD-10-CM

## 2023-01-06 RX ORDER — ALBUTEROL SULFATE 90 UG/1
2 AEROSOL, METERED RESPIRATORY (INHALATION)
Qty: 18 G | Refills: 1 | Status: SHIPPED | OUTPATIENT
Start: 2023-01-06

## 2023-01-06 RX ORDER — AMLODIPINE BESYLATE 5 MG/1
5 TABLET ORAL DAILY
Qty: 90 TABLET | Refills: 0 | Status: SHIPPED | OUTPATIENT
Start: 2023-01-06

## 2023-01-06 NOTE — TELEPHONE ENCOUNTER
Requested Prescriptions     Pending Prescriptions Disp Refills    amLODIPine (NORVASC) 5 mg tablet 90 Tablet 3     Sig: Take 1 Tablet by mouth daily.

## 2023-02-15 ENCOUNTER — TELEPHONE (OUTPATIENT)
Facility: CLINIC | Age: 68
End: 2023-02-15

## 2023-02-15 DIAGNOSIS — J44.9 CHRONIC OBSTRUCTIVE PULMONARY DISEASE, UNSPECIFIED COPD TYPE (HCC): Primary | ICD-10-CM

## 2023-02-15 RX ORDER — ALBUTEROL SULFATE 90 UG/1
2 AEROSOL, METERED RESPIRATORY (INHALATION) 4 TIMES DAILY PRN
Qty: 54 G | Refills: 1 | Status: SHIPPED | OUTPATIENT
Start: 2023-02-15

## 2023-02-15 NOTE — TELEPHONE ENCOUNTER
----- Message from Jose Luis Caldera sent at 2/15/2023  2:08 PM EST -----  Subject: Medication Problem    Medication: Other - amLODIPine (NORVASC) 5 mg tablet   Dosage: 5mg once daily (at night)  Ordering Provider: Ema Aguilera    Question/Problem: Patient canceled her appt today due to stomach upset and   feeling sick in the stomach (attempted to switch her to a VV but she's not   tech-savvy enough for VV's). She's wondering if this stomach upset is   related to this BP pill. A couple of weeks after she started taking this   medication, she started up with some abnormal burping. Now she's getting   bouts of nausea too. She said this is really the only thing she can tie   the gastro issues to and is wondering if this medication is causing it. She said it's working for her BP, but the stomach issues have got her   down.       Pharmacy: [facility] [id number]    ---------------------------------------------------------------------------  --------------  Daryle Haver INFO  [phone]; OK to leave message on voicemail  ---------------------------------------------------------------------------  --------------    SCRIPT ANSWERS  Relationship to Patient: Self

## 2023-02-15 NOTE — TELEPHONE ENCOUNTER
----- Message from Michael Nose sent at 2/15/2023  2:02 PM EST -----  Subject: Medication Problem    Medication: Other - albuterol (PROVENTIL HFA, VENTOLIN HFA, PROAIR HFA) 90   mcg/actuation inhaler   Dosage: as needed at night  Ordering Provider: Krishna Noonan    Question/Problem: patient got a letter from her insurance stating that   this drug is not on her plan's formulary and will cover a temporary supply   and is suggesting the following in lieu: Ventolin HFA (drug tier III) or   Albuterol HFA/Proair (drug tier II). She's OK with this inhaler for the   time being, but she doesn't want to get over a barrel and wanted to loop   you in on the insurance coverage issue.        Pharmacy: [facility] [id number], [address]    ---------------------------------------------------------------------------  --------------  Ronny FALL  [phone]; OK to leave message on voicemail  ---------------------------------------------------------------------------  --------------    SCRIPT ANSWERS  Relationship to Patient: Self

## 2023-04-03 RX ORDER — AMLODIPINE BESYLATE 5 MG/1
5 TABLET ORAL DAILY
Qty: 90 TABLET | Refills: 1 | Status: SHIPPED | OUTPATIENT
Start: 2023-04-03

## 2023-04-05 ENCOUNTER — OFFICE VISIT (OUTPATIENT)
Facility: CLINIC | Age: 68
End: 2023-04-05
Payer: MEDICARE

## 2023-04-05 VITALS
HEART RATE: 92 BPM | OXYGEN SATURATION: 91 % | SYSTOLIC BLOOD PRESSURE: 131 MMHG | TEMPERATURE: 97.4 F | HEIGHT: 68 IN | WEIGHT: 226.6 LBS | BODY MASS INDEX: 34.34 KG/M2 | DIASTOLIC BLOOD PRESSURE: 81 MMHG

## 2023-04-05 DIAGNOSIS — N18.31 CHRONIC KIDNEY DISEASE, STAGE 3A (HCC): ICD-10-CM

## 2023-04-05 DIAGNOSIS — Z12.11 COLON CANCER SCREENING: ICD-10-CM

## 2023-04-05 DIAGNOSIS — J44.9 CHRONIC OBSTRUCTIVE PULMONARY DISEASE, UNSPECIFIED COPD TYPE (HCC): ICD-10-CM

## 2023-04-05 DIAGNOSIS — I10 PRIMARY HYPERTENSION: Primary | ICD-10-CM

## 2023-04-05 DIAGNOSIS — E66.9 OBESITY, CLASS I, BMI 30-34.9: ICD-10-CM

## 2023-04-05 PROCEDURE — G8400 PT W/DXA NO RESULTS DOC: HCPCS | Performed by: STUDENT IN AN ORGANIZED HEALTH CARE EDUCATION/TRAINING PROGRAM

## 2023-04-05 PROCEDURE — 4004F PT TOBACCO SCREEN RCVD TLK: CPT | Performed by: STUDENT IN AN ORGANIZED HEALTH CARE EDUCATION/TRAINING PROGRAM

## 2023-04-05 PROCEDURE — 99214 OFFICE O/P EST MOD 30 MIN: CPT | Performed by: STUDENT IN AN ORGANIZED HEALTH CARE EDUCATION/TRAINING PROGRAM

## 2023-04-05 PROCEDURE — 1090F PRES/ABSN URINE INCON ASSESS: CPT | Performed by: STUDENT IN AN ORGANIZED HEALTH CARE EDUCATION/TRAINING PROGRAM

## 2023-04-05 PROCEDURE — G8427 DOCREV CUR MEDS BY ELIG CLIN: HCPCS | Performed by: STUDENT IN AN ORGANIZED HEALTH CARE EDUCATION/TRAINING PROGRAM

## 2023-04-05 PROCEDURE — G8417 CALC BMI ABV UP PARAM F/U: HCPCS | Performed by: STUDENT IN AN ORGANIZED HEALTH CARE EDUCATION/TRAINING PROGRAM

## 2023-04-05 PROCEDURE — 3075F SYST BP GE 130 - 139MM HG: CPT | Performed by: STUDENT IN AN ORGANIZED HEALTH CARE EDUCATION/TRAINING PROGRAM

## 2023-04-05 PROCEDURE — 3017F COLORECTAL CA SCREEN DOC REV: CPT | Performed by: STUDENT IN AN ORGANIZED HEALTH CARE EDUCATION/TRAINING PROGRAM

## 2023-04-05 PROCEDURE — 3079F DIAST BP 80-89 MM HG: CPT | Performed by: STUDENT IN AN ORGANIZED HEALTH CARE EDUCATION/TRAINING PROGRAM

## 2023-04-05 PROCEDURE — 3023F SPIROM DOC REV: CPT | Performed by: STUDENT IN AN ORGANIZED HEALTH CARE EDUCATION/TRAINING PROGRAM

## 2023-04-05 PROCEDURE — 1123F ACP DISCUSS/DSCN MKR DOCD: CPT | Performed by: STUDENT IN AN ORGANIZED HEALTH CARE EDUCATION/TRAINING PROGRAM

## 2023-04-05 RX ORDER — ALBUTEROL SULFATE 90 UG/1
2 AEROSOL, METERED RESPIRATORY (INHALATION) 4 TIMES DAILY PRN
Qty: 54 G | Refills: 1 | Status: SHIPPED | OUTPATIENT
Start: 2023-04-05

## 2023-04-05 ASSESSMENT — PATIENT HEALTH QUESTIONNAIRE - PHQ9
SUM OF ALL RESPONSES TO PHQ QUESTIONS 1-9: 2
SUM OF ALL RESPONSES TO PHQ9 QUESTIONS 1 & 2: 2
2. FEELING DOWN, DEPRESSED OR HOPELESS: 2
SUM OF ALL RESPONSES TO PHQ QUESTIONS 1-9: 2
SUM OF ALL RESPONSES TO PHQ QUESTIONS 1-9: 2
1. LITTLE INTEREST OR PLEASURE IN DOING THINGS: 0
SUM OF ALL RESPONSES TO PHQ QUESTIONS 1-9: 2

## 2023-04-05 NOTE — PROGRESS NOTES
Denisha Paz presents today for   Chief Complaint   Patient presents with    Follow-up    Asthma       Is someone accompanying this pt? No     Is the patient using any DME equipment during OV? She uses a wheelchair as a walker     Health Maintenance reviewed and discussed and ordered per Provider. Health Maintenance Due   Topic Date Due    COVID-19 Vaccine (1) Never done    Pneumococcal 65+ years Vaccine (1 - PCV) Never done    DTaP/Tdap/Td vaccine (1 - Tdap) Never done    Colorectal Cancer Screen  Never done    Breast cancer screen  Never done    Shingles vaccine (1 of 2) Never done    DEXA (modify frequency per FRAX score)  Never done   . Coordination of Care:  1. \"Have you been to the ER, urgent care clinic since your last visit? Hospitalized since your last visit? \" No    2. \"Have you seen or consulted any other health care providers outside of the [address] [place] [address] since your last visit? \" No    3. For patients aged 39-70: Has the patient had a colonoscopy? No    If the patient is female:    4. For patients aged 41-77: Has the patient had a mammogram within the past 2 years? No    5. For patients aged 21-65: Has the patient had a pap smear?  No
sulfate HFA (VENTOLIN HFA) 108 (90 Base) MCG/ACT inhaler; Inhale 2 puffs into the lungs 4 times daily as needed for Wheezing, Disp-54 g, R-1Please substitute for insurance preference- patient prefers ventolin brand nameNormal  Obesity, Class I, BMI 30-34.9  Comments:  Eating less and using portion control. Has lost weight since last visit. Colon cancer screening  -     Cologuard (Fecal DNA Colorectal Cancer Screening); Future       No follow-up provider specified. I have discussed the diagnosis with the patient and the intended plan as seen in the above orders. The patient has received an after-visit summary and questions were answered concerning future plans. I have discussed medication side effects and warnings with the patient as well. I have reviewed the plan of care with the patient, accepted their input and they are in agreement with the treatment goals. Previous lab and imaging results were reviewed by me.        Lani Merlos MD  April 5, 2023

## 2023-09-27 ENCOUNTER — OFFICE VISIT (OUTPATIENT)
Facility: CLINIC | Age: 68
End: 2023-09-27
Payer: MEDICARE

## 2023-09-27 VITALS
TEMPERATURE: 98 F | BODY MASS INDEX: 34.05 KG/M2 | DIASTOLIC BLOOD PRESSURE: 82 MMHG | HEART RATE: 88 BPM | OXYGEN SATURATION: 91 % | SYSTOLIC BLOOD PRESSURE: 122 MMHG | WEIGHT: 224.7 LBS | HEIGHT: 68 IN

## 2023-09-27 DIAGNOSIS — F17.200 NICOTINE USE DISORDER: ICD-10-CM

## 2023-09-27 DIAGNOSIS — I10 PRIMARY HYPERTENSION: Primary | ICD-10-CM

## 2023-09-27 DIAGNOSIS — E66.01 SEVERE OBESITY (BMI 35.0-39.9) WITH COMORBIDITY (HCC): ICD-10-CM

## 2023-09-27 DIAGNOSIS — Z78.0 ENCOUNTER FOR OSTEOPOROSIS SCREENING IN ASYMPTOMATIC POSTMENOPAUSAL PATIENT: ICD-10-CM

## 2023-09-27 DIAGNOSIS — E78.2 MIXED HYPERLIPIDEMIA: ICD-10-CM

## 2023-09-27 DIAGNOSIS — J44.9 ASTHMA-COPD OVERLAP SYNDROME: ICD-10-CM

## 2023-09-27 DIAGNOSIS — Z12.31 ENCOUNTER FOR SCREENING MAMMOGRAM FOR MALIGNANT NEOPLASM OF BREAST: ICD-10-CM

## 2023-09-27 DIAGNOSIS — R73.03 PREDIABETES: ICD-10-CM

## 2023-09-27 DIAGNOSIS — N18.31 CHRONIC KIDNEY DISEASE, STAGE 3A (HCC): ICD-10-CM

## 2023-09-27 DIAGNOSIS — Z53.20 LUNG CANCER SCREENING DECLINED BY PATIENT: ICD-10-CM

## 2023-09-27 DIAGNOSIS — Z53.20 COLON CANCER SCREENING DECLINED: ICD-10-CM

## 2023-09-27 DIAGNOSIS — Z13.820 ENCOUNTER FOR OSTEOPOROSIS SCREENING IN ASYMPTOMATIC POSTMENOPAUSAL PATIENT: ICD-10-CM

## 2023-09-27 DIAGNOSIS — R21 RASH: ICD-10-CM

## 2023-09-27 PROCEDURE — 99214 OFFICE O/P EST MOD 30 MIN: CPT | Performed by: STUDENT IN AN ORGANIZED HEALTH CARE EDUCATION/TRAINING PROGRAM

## 2023-09-27 PROCEDURE — 4004F PT TOBACCO SCREEN RCVD TLK: CPT | Performed by: STUDENT IN AN ORGANIZED HEALTH CARE EDUCATION/TRAINING PROGRAM

## 2023-09-27 PROCEDURE — 1123F ACP DISCUSS/DSCN MKR DOCD: CPT | Performed by: STUDENT IN AN ORGANIZED HEALTH CARE EDUCATION/TRAINING PROGRAM

## 2023-09-27 PROCEDURE — 3023F SPIROM DOC REV: CPT | Performed by: STUDENT IN AN ORGANIZED HEALTH CARE EDUCATION/TRAINING PROGRAM

## 2023-09-27 PROCEDURE — 3074F SYST BP LT 130 MM HG: CPT | Performed by: STUDENT IN AN ORGANIZED HEALTH CARE EDUCATION/TRAINING PROGRAM

## 2023-09-27 PROCEDURE — G8417 CALC BMI ABV UP PARAM F/U: HCPCS | Performed by: STUDENT IN AN ORGANIZED HEALTH CARE EDUCATION/TRAINING PROGRAM

## 2023-09-27 PROCEDURE — 3079F DIAST BP 80-89 MM HG: CPT | Performed by: STUDENT IN AN ORGANIZED HEALTH CARE EDUCATION/TRAINING PROGRAM

## 2023-09-27 PROCEDURE — G8400 PT W/DXA NO RESULTS DOC: HCPCS | Performed by: STUDENT IN AN ORGANIZED HEALTH CARE EDUCATION/TRAINING PROGRAM

## 2023-09-27 PROCEDURE — G8428 CUR MEDS NOT DOCUMENT: HCPCS | Performed by: STUDENT IN AN ORGANIZED HEALTH CARE EDUCATION/TRAINING PROGRAM

## 2023-09-27 PROCEDURE — 1090F PRES/ABSN URINE INCON ASSESS: CPT | Performed by: STUDENT IN AN ORGANIZED HEALTH CARE EDUCATION/TRAINING PROGRAM

## 2023-09-27 PROCEDURE — 3017F COLORECTAL CA SCREEN DOC REV: CPT | Performed by: STUDENT IN AN ORGANIZED HEALTH CARE EDUCATION/TRAINING PROGRAM

## 2023-09-27 RX ORDER — AMLODIPINE BESYLATE 5 MG/1
5 TABLET ORAL DAILY
Qty: 90 TABLET | Refills: 1 | Status: SHIPPED | OUTPATIENT
Start: 2023-09-27

## 2023-09-27 RX ORDER — FLUTICASONE PROPIONATE AND SALMETEROL 250; 50 UG/1; UG/1
1 POWDER RESPIRATORY (INHALATION) EVERY 12 HOURS
Qty: 60 EACH | Refills: 3 | Status: SHIPPED | OUTPATIENT
Start: 2023-09-27

## 2023-09-27 RX ORDER — TRIAMCINOLONE ACETONIDE 1 MG/G
OINTMENT TOPICAL 2 TIMES DAILY
Qty: 80 G | Refills: 3 | Status: SHIPPED | OUTPATIENT
Start: 2023-09-27

## 2023-09-27 ASSESSMENT — PATIENT HEALTH QUESTIONNAIRE - PHQ9
SUM OF ALL RESPONSES TO PHQ QUESTIONS 1-9: 0
SUM OF ALL RESPONSES TO PHQ QUESTIONS 1-9: 0
1. LITTLE INTEREST OR PLEASURE IN DOING THINGS: 0
SUM OF ALL RESPONSES TO PHQ9 QUESTIONS 1 & 2: 0
2. FEELING DOWN, DEPRESSED OR HOPELESS: 0
SUM OF ALL RESPONSES TO PHQ QUESTIONS 1-9: 0
SUM OF ALL RESPONSES TO PHQ QUESTIONS 1-9: 0

## 2023-09-27 NOTE — PROGRESS NOTES
Marguerite Olivarestz presents today for   Chief Complaint   Patient presents with    3 Month Follow-Up     Blood pressure       Medication Refill       Is someone accompanying this pt? No    Is the patient using any DME equipment during OV? Uses a wheelchair as a walker     Health Maintenance reviewed and discussed and ordered per Provider. Health Maintenance Due   Topic Date Due    COVID-19 Vaccine (1) Never done    Pneumococcal 65+ years Vaccine (1 - PCV) Never done    GFR test (Diabetes, CKD 3-4, OR last GFR 15-59)  Never done    DTaP/Tdap/Td vaccine (1 - Tdap) Never done    Colorectal Cancer Screen  Never done    Breast cancer screen  Never done    Shingles vaccine (1 of 2) Never done    DEXA (modify frequency per FRAX score)  Never done    Flu vaccine (1) Never done    Annual Wellness Visit (AWV)  10/04/2023   . Coordination of Care:  1. \"Have you been to the ER, urgent care clinic since your last visit? Hospitalized since your last visit? \" No    2. \"Have you seen or consulted any other health care providers outside of the [address] [place] [address] since your last visit? \" No    3. For patients aged 43-73: Has the patient had a colonoscopy? Ordered     If the patient is female:    4. For patients aged 43-66: Has the patient had a mammogram within the past 2 years? No    5. For patients aged 21-65: Has the patient had a pap smear?  N/A based on age/sex

## 2023-12-01 ENCOUNTER — APPOINTMENT (OUTPATIENT)
Age: 68
DRG: 189 | End: 2023-12-01
Attending: EMERGENCY MEDICINE
Payer: MEDICARE

## 2023-12-01 ENCOUNTER — APPOINTMENT (OUTPATIENT)
Age: 68
DRG: 189 | End: 2023-12-01
Payer: MEDICARE

## 2023-12-01 ENCOUNTER — HOSPITAL ENCOUNTER (INPATIENT)
Age: 68
LOS: 3 days | Discharge: HOME | DRG: 189 | End: 2023-12-04
Attending: EMERGENCY MEDICINE | Admitting: INTERNAL MEDICINE
Payer: MEDICARE

## 2023-12-01 DIAGNOSIS — G93.40 ENCEPHALOPATHY ACUTE: ICD-10-CM

## 2023-12-01 DIAGNOSIS — J96.01 ACUTE RESPIRATORY FAILURE WITH HYPOXIA AND HYPERCAPNIA (HCC): Primary | ICD-10-CM

## 2023-12-01 DIAGNOSIS — J96.02 ACUTE RESPIRATORY FAILURE WITH HYPOXIA AND HYPERCAPNIA (HCC): Primary | ICD-10-CM

## 2023-12-01 DIAGNOSIS — J44.1 COPD EXACERBATION (HCC): ICD-10-CM

## 2023-12-01 LAB
ALBUMIN SERPL-MCNC: 3 G/DL (ref 3.4–5)
ALBUMIN/GLOB SERPL: 0.7 (ref 0.8–1.7)
ALP SERPL-CCNC: 140 U/L (ref 45–117)
ALT SERPL-CCNC: 18 U/L (ref 13–56)
ANION GAP SERPL CALC-SCNC: 4 MMOL/L (ref 3–18)
ARTERIAL PATENCY WRIST A: YES
AST SERPL W P-5'-P-CCNC: 14 U/L (ref 10–38)
BASE EXCESS BLDA CALC-SCNC: 2.1 MMOL/L (ref 0–3)
BASE EXCESS BLDA CALC-SCNC: 3 MMOL/L (ref 0–3)
BASE EXCESS BLDA CALC-SCNC: 5.2 MMOL/L (ref 0–3)
BASOPHILS # BLD: 0.1 K/UL (ref 0–0.1)
BASOPHILS NFR BLD: 1 % (ref 0–2)
BDY SITE: ABNORMAL
BILIRUB SERPL-MCNC: 1.2 MG/DL (ref 0.2–1)
BNP SERPL-MCNC: ABNORMAL PG/ML (ref 0–900)
BREATHS.SPONTANEOUS ON VENT: 18
BREATHS.SPONTANEOUS ON VENT: 20
BUN SERPL-MCNC: 19 MG/DL (ref 7–18)
BUN/CREAT SERPL: 15 (ref 12–20)
CA-I BLD-MCNC: 8.8 MG/DL (ref 8.5–10.1)
CHLORIDE SERPL-SCNC: 95 MMOL/L (ref 100–111)
CO2 SERPL-SCNC: 38 MMOL/L (ref 21–32)
COHGB MFR BLD: 3.1 % (ref 1–2)
COHGB MFR BLD: 3.2 % (ref 1–2)
COHGB MFR BLD: 4.1 % (ref 1–2)
CREAT SERPL-MCNC: 1.31 MG/DL (ref 0.6–1.3)
DIFFERENTIAL METHOD BLD: ABNORMAL
EKG ATRIAL RATE: 92 BPM
EKG DIAGNOSIS: NORMAL
EKG P AXIS: 70 DEGREES
EKG P-R INTERVAL: 134 MS
EKG Q-T INTERVAL: 360 MS
EKG QRS DURATION: 80 MS
EKG QTC CALCULATION (BAZETT): 445 MS
EKG R AXIS: 121 DEGREES
EKG T AXIS: 66 DEGREES
EKG VENTRICULAR RATE: 92 BPM
EOSINOPHIL # BLD: 0.1 K/UL (ref 0–0.4)
EOSINOPHIL NFR BLD: 1 % (ref 0–5)
ERYTHROCYTE [DISTWIDTH] IN BLOOD BY AUTOMATED COUNT: 14.9 % (ref 11.6–14.5)
FIO2 ON VENT: 40 %
FIO2 ON VENT: 40 %
FLUAV AG NPH QL IA: NEGATIVE
FLUBV AG NOSE QL IA: NEGATIVE
GAS FLOW.O2 SETTING OXYMISER: 18
GAS FLOW.O2 SETTING OXYMISER: 8
GLOBULIN SER CALC-MCNC: 4.2 G/DL (ref 2–4)
GLUCOSE SERPL-MCNC: 90 MG/DL (ref 74–99)
HCO3 BLDA-SCNC: 33 MMOL/L (ref 22–26)
HCO3 BLDA-SCNC: 33 MMOL/L (ref 22–26)
HCO3 BLDA-SCNC: 35 MMOL/L (ref 22–26)
HCT VFR BLD AUTO: 54 % (ref 35–45)
HGB BLD-MCNC: 16.2 G/DL (ref 12–16)
IMM GRANULOCYTES # BLD AUTO: 0.1 K/UL (ref 0–0.04)
IMM GRANULOCYTES NFR BLD AUTO: 1 % (ref 0–0.5)
IPAP/PIP: 16
IPAP/PIP: 24
LACTATE SERPL-SCNC: 1 MMOL/L (ref 0.4–2)
LYMPHOCYTES # BLD: 0.8 K/UL (ref 0.9–3.6)
LYMPHOCYTES NFR BLD: 9 % (ref 21–52)
MAGNESIUM SERPL-MCNC: 2.2 MG/DL (ref 1.6–2.6)
MCH RBC QN AUTO: 29.5 PG (ref 24–34)
MCHC RBC AUTO-ENTMCNC: 30 G/DL (ref 31–37)
MCV RBC AUTO: 98.2 FL (ref 78–100)
METHGB MFR BLD: 0.2 % (ref 0–1.4)
METHGB MFR BLD: 0.3 % (ref 0–1.4)
METHGB MFR BLD: 0.4 % (ref 0–1.4)
MONOCYTES # BLD: 0.5 K/UL (ref 0.05–1.2)
MONOCYTES NFR BLD: 5 % (ref 3–10)
NEUTS SEG # BLD: 7.9 K/UL (ref 1.8–8)
NEUTS SEG NFR BLD: 83 % (ref 40–73)
NRBC # BLD: 0.02 K/UL (ref 0–0.01)
NRBC BLD-RTO: 0.2 PER 100 WBC
OXYHGB MFR BLD: 64.5 % (ref 95–99)
OXYHGB MFR BLD: 90.7 % (ref 95–99)
OXYHGB MFR BLD: 92.9 % (ref 95–99)
PCO2 BLDA: 72 MMHG (ref 35–45)
PCO2 BLDA: 72 MMHG (ref 35–45)
PCO2 BLDA: 79 MMHG (ref 35–45)
PEEP RESPIRATORY: 12
PEEP RESPIRATORY: 7
PERFORMED BY:: ABNORMAL
PH BLDA: 7.24 (ref 7.35–7.45)
PH BLDA: 7.28 (ref 7.35–7.45)
PH BLDA: 7.3 (ref 7.35–7.45)
PLATELET # BLD AUTO: 197 K/UL (ref 135–420)
PMV BLD AUTO: 11.7 FL (ref 9.2–11.8)
PO2 BLDA: 39 MMHG (ref 80–100)
PO2 BLDA: 79 MMHG (ref 80–100)
PO2 BLDA: 97 MMHG (ref 80–100)
POTASSIUM SERPL-SCNC: 4.5 MMOL/L (ref 3.5–5.5)
PROT SERPL-MCNC: 7.2 G/DL (ref 6.4–8.2)
RBC # BLD AUTO: 5.5 M/UL (ref 4.2–5.3)
SAO2 % BLD: 67 % (ref 95–99)
SAO2 % BLD: 94 % (ref 95–99)
SAO2 % BLD: 96 % (ref 95–99)
SAO2% DEVICE SAO2% SENSOR NAME: ABNORMAL
SARS-COV-2 RDRP RESP QL NAA+PROBE: NOT DETECTED
SERVICE CMNT-IMP: ABNORMAL
SODIUM SERPL-SCNC: 137 MMOL/L (ref 136–145)
SPECIMEN SITE: ABNORMAL
TROPONIN I SERPL HS-MCNC: 107 NG/L (ref 0–54)
TROPONIN I SERPL HS-MCNC: 109 NG/L (ref 0–54)
WBC # BLD AUTO: 9.4 K/UL (ref 4.6–13.2)

## 2023-12-01 PROCEDURE — 71275 CT ANGIOGRAPHY CHEST: CPT

## 2023-12-01 PROCEDURE — 84484 ASSAY OF TROPONIN QUANT: CPT

## 2023-12-01 PROCEDURE — 87804 INFLUENZA ASSAY W/OPTIC: CPT

## 2023-12-01 PROCEDURE — 2700000000 HC OXYGEN THERAPY PER DAY

## 2023-12-01 PROCEDURE — 87040 BLOOD CULTURE FOR BACTERIA: CPT

## 2023-12-01 PROCEDURE — 2580000003 HC RX 258: Performed by: EMERGENCY MEDICINE

## 2023-12-01 PROCEDURE — 6360000002 HC RX W HCPCS: Performed by: EMERGENCY MEDICINE

## 2023-12-01 PROCEDURE — 94640 AIRWAY INHALATION TREATMENT: CPT

## 2023-12-01 PROCEDURE — 93005 ELECTROCARDIOGRAM TRACING: CPT | Performed by: EMERGENCY MEDICINE

## 2023-12-01 PROCEDURE — 2000000000 HC ICU R&B

## 2023-12-01 PROCEDURE — 82803 BLOOD GASES ANY COMBINATION: CPT

## 2023-12-01 PROCEDURE — 6370000000 HC RX 637 (ALT 250 FOR IP): Performed by: EMERGENCY MEDICINE

## 2023-12-01 PROCEDURE — 36600 WITHDRAWAL OF ARTERIAL BLOOD: CPT

## 2023-12-01 PROCEDURE — 94761 N-INVAS EAR/PLS OXIMETRY MLT: CPT

## 2023-12-01 PROCEDURE — 83735 ASSAY OF MAGNESIUM: CPT

## 2023-12-01 PROCEDURE — 80053 COMPREHEN METABOLIC PANEL: CPT

## 2023-12-01 PROCEDURE — 96374 THER/PROPH/DIAG INJ IV PUSH: CPT

## 2023-12-01 PROCEDURE — 36415 COLL VENOUS BLD VENIPUNCTURE: CPT

## 2023-12-01 PROCEDURE — 71045 X-RAY EXAM CHEST 1 VIEW: CPT

## 2023-12-01 PROCEDURE — 2580000003 HC RX 258: Performed by: NURSE PRACTITIONER

## 2023-12-01 PROCEDURE — 87635 SARS-COV-2 COVID-19 AMP PRB: CPT

## 2023-12-01 PROCEDURE — 94660 CPAP INITIATION&MGMT: CPT

## 2023-12-01 PROCEDURE — 6360000004 HC RX CONTRAST MEDICATION: Performed by: EMERGENCY MEDICINE

## 2023-12-01 PROCEDURE — 96365 THER/PROPH/DIAG IV INF INIT: CPT

## 2023-12-01 PROCEDURE — 85025 COMPLETE CBC W/AUTO DIFF WBC: CPT

## 2023-12-01 PROCEDURE — 83880 ASSAY OF NATRIURETIC PEPTIDE: CPT

## 2023-12-01 PROCEDURE — 5A09357 ASSISTANCE WITH RESPIRATORY VENTILATION, LESS THAN 24 CONSECUTIVE HOURS, CONTINUOUS POSITIVE AIRWAY PRESSURE: ICD-10-PCS | Performed by: INTERNAL MEDICINE

## 2023-12-01 PROCEDURE — 83605 ASSAY OF LACTIC ACID: CPT

## 2023-12-01 PROCEDURE — 94664 DEMO&/EVAL PT USE INHALER: CPT

## 2023-12-01 PROCEDURE — 96375 TX/PRO/DX INJ NEW DRUG ADDON: CPT

## 2023-12-01 PROCEDURE — 6360000002 HC RX W HCPCS: Performed by: NURSE PRACTITIONER

## 2023-12-01 PROCEDURE — 70450 CT HEAD/BRAIN W/O DYE: CPT

## 2023-12-01 PROCEDURE — 99291 CRITICAL CARE FIRST HOUR: CPT

## 2023-12-01 RX ORDER — MAGNESIUM SULFATE IN WATER 40 MG/ML
2000 INJECTION, SOLUTION INTRAVENOUS
Status: COMPLETED | OUTPATIENT
Start: 2023-12-01 | End: 2023-12-01

## 2023-12-01 RX ORDER — IPRATROPIUM BROMIDE AND ALBUTEROL SULFATE 2.5; .5 MG/3ML; MG/3ML
1 SOLUTION RESPIRATORY (INHALATION)
Status: DISCONTINUED | OUTPATIENT
Start: 2023-12-02 | End: 2023-12-03

## 2023-12-01 RX ORDER — IPRATROPIUM BROMIDE AND ALBUTEROL SULFATE 2.5; .5 MG/3ML; MG/3ML
1 SOLUTION RESPIRATORY (INHALATION)
Status: COMPLETED | OUTPATIENT
Start: 2023-12-01 | End: 2023-12-01

## 2023-12-01 RX ORDER — FUROSEMIDE 10 MG/ML
40 INJECTION INTRAMUSCULAR; INTRAVENOUS ONCE
Status: COMPLETED | OUTPATIENT
Start: 2023-12-01 | End: 2023-12-01

## 2023-12-01 RX ORDER — ALBUTEROL SULFATE 2.5 MG/3ML
2.5 SOLUTION RESPIRATORY (INHALATION) EVERY 4 HOURS PRN
Status: DISCONTINUED | OUTPATIENT
Start: 2023-12-01 | End: 2023-12-04 | Stop reason: HOSPADM

## 2023-12-01 RX ADMIN — METHYLPREDNISOLONE SODIUM SUCCINATE 125 MG: 125 INJECTION INTRAMUSCULAR; INTRAVENOUS at 19:09

## 2023-12-01 RX ADMIN — FUROSEMIDE 40 MG: 10 INJECTION, SOLUTION INTRAMUSCULAR; INTRAVENOUS at 21:40

## 2023-12-01 RX ADMIN — MAGNESIUM SULFATE HEPTAHYDRATE 2000 MG: 40 INJECTION, SOLUTION INTRAVENOUS at 20:23

## 2023-12-01 RX ADMIN — CEFTRIAXONE SODIUM 1000 MG: 1 INJECTION, POWDER, FOR SOLUTION INTRAMUSCULAR; INTRAVENOUS at 19:05

## 2023-12-01 RX ADMIN — IPRATROPIUM BROMIDE AND ALBUTEROL SULFATE 1 DOSE: .5; 3 SOLUTION RESPIRATORY (INHALATION) at 20:31

## 2023-12-01 RX ADMIN — WATER 60 MG: 1 INJECTION INTRAMUSCULAR; INTRAVENOUS; SUBCUTANEOUS at 23:14

## 2023-12-01 RX ADMIN — AZITHROMYCIN MONOHYDRATE 500 MG: 500 INJECTION, POWDER, LYOPHILIZED, FOR SOLUTION INTRAVENOUS at 20:23

## 2023-12-01 RX ADMIN — IOPAMIDOL 93 ML: 755 INJECTION, SOLUTION INTRAVENOUS at 20:25

## 2023-12-01 ASSESSMENT — ENCOUNTER SYMPTOMS
COUGH: 1
SHORTNESS OF BREATH: 1

## 2023-12-01 ASSESSMENT — PAIN SCALES - GENERAL: PAINLEVEL_OUTOF10: 0

## 2023-12-01 ASSESSMENT — PAIN - FUNCTIONAL ASSESSMENT: PAIN_FUNCTIONAL_ASSESSMENT: NONE - DENIES PAIN

## 2023-12-01 NOTE — ED NOTES
Pt came into the ED by EMT. EMS reported pt complaining of vertigo and flu like symptoms. Pt stated she have some chest tightness and didn't   know she was desating in the 70's.       All Anthony RN  12/01/23 2752

## 2023-12-02 LAB
ANION GAP SERPL CALC-SCNC: 10 MMOL/L (ref 3–18)
APPEARANCE UR: CLEAR
ARTERIAL PATENCY WRIST A: YES
BASE EXCESS BLDA CALC-SCNC: 3.6 MMOL/L (ref 0–3)
BASOPHILS # BLD: 0 K/UL (ref 0–0.1)
BASOPHILS NFR BLD: 0 % (ref 0–2)
BDY SITE: ABNORMAL
BILIRUB UR QL: NEGATIVE
BUN SERPL-MCNC: 22 MG/DL (ref 7–18)
BUN/CREAT SERPL: 15 (ref 12–20)
CA-I BLD-MCNC: 8.9 MG/DL (ref 8.5–10.1)
CHLORIDE SERPL-SCNC: 92 MMOL/L (ref 100–111)
CO2 SERPL-SCNC: 37 MMOL/L (ref 21–32)
COHGB MFR BLD: 2.8 % (ref 1–2)
COLOR UR: YELLOW
CREAT SERPL-MCNC: 1.46 MG/DL (ref 0.6–1.3)
DIFFERENTIAL METHOD BLD: ABNORMAL
EOSINOPHIL # BLD: 0 K/UL (ref 0–0.4)
EOSINOPHIL NFR BLD: 0 % (ref 0–5)
ERYTHROCYTE [DISTWIDTH] IN BLOOD BY AUTOMATED COUNT: 14.6 % (ref 11.6–14.5)
FIO2 ON VENT: 30 %
GAS FLOW.O2 SETTING OXYMISER: 18
GLUCOSE SERPL-MCNC: 130 MG/DL (ref 74–99)
GLUCOSE UR STRIP.AUTO-MCNC: NEGATIVE MG/DL
HCO3 BLDA-SCNC: 35 MMOL/L (ref 22–26)
HCT VFR BLD AUTO: 54.9 % (ref 35–45)
HGB BLD-MCNC: 16.6 G/DL (ref 12–16)
HGB UR QL STRIP: NEGATIVE
IMM GRANULOCYTES # BLD AUTO: 0.1 K/UL (ref 0–0.04)
IMM GRANULOCYTES NFR BLD AUTO: 1 % (ref 0–0.5)
IPAP/PIP: 24
KETONES UR QL STRIP.AUTO: NEGATIVE MG/DL
LEUKOCYTE ESTERASE UR QL STRIP.AUTO: NEGATIVE
LYMPHOCYTES # BLD: 0.3 K/UL (ref 0.9–3.6)
LYMPHOCYTES NFR BLD: 3 % (ref 21–52)
MCH RBC QN AUTO: 29.6 PG (ref 24–34)
MCHC RBC AUTO-ENTMCNC: 30.2 G/DL (ref 31–37)
MCV RBC AUTO: 97.9 FL (ref 78–100)
METHGB MFR BLD: 0.6 % (ref 0–1.4)
MONOCYTES # BLD: 0 K/UL (ref 0.05–1.2)
MONOCYTES NFR BLD: 0 % (ref 3–10)
NEUTS SEG # BLD: 9 K/UL (ref 1.8–8)
NEUTS SEG NFR BLD: 96 % (ref 40–73)
NITRITE UR QL STRIP.AUTO: NEGATIVE
NRBC # BLD: 0 K/UL (ref 0–0.01)
NRBC BLD-RTO: 0 PER 100 WBC
OXYHGB MFR BLD: 90.6 % (ref 95–99)
PCO2 BLDA: 81 MMHG (ref 35–45)
PEEP RESPIRATORY: 12
PERFORMED BY:: ABNORMAL
PH BLDA: 7.25 (ref 7.35–7.45)
PH UR STRIP: 5.5 (ref 5–8)
PLATELET # BLD AUTO: 162 K/UL (ref 135–420)
PMV BLD AUTO: 11.7 FL (ref 9.2–11.8)
PO2 BLDA: 80 MMHG (ref 80–100)
POTASSIUM SERPL-SCNC: 4.8 MMOL/L (ref 3.5–5.5)
PROT UR STRIP-MCNC: NEGATIVE MG/DL
RBC # BLD AUTO: 5.61 M/UL (ref 4.2–5.3)
SAO2 % BLD: 94 % (ref 95–99)
SAO2% DEVICE SAO2% SENSOR NAME: ABNORMAL
SERVICE CMNT-IMP: ABNORMAL
SODIUM SERPL-SCNC: 139 MMOL/L (ref 136–145)
SP GR UR REFRACTOMETRY: 1.03 (ref 1–1.03)
SPECIMEN SITE: ABNORMAL
TOTAL RATE: 18
UROBILINOGEN UR QL STRIP.AUTO: 1 EU/DL (ref 0.2–1)
WBC # BLD AUTO: 9.4 K/UL (ref 4.6–13.2)

## 2023-12-02 PROCEDURE — 6370000000 HC RX 637 (ALT 250 FOR IP)

## 2023-12-02 PROCEDURE — 2700000000 HC OXYGEN THERAPY PER DAY

## 2023-12-02 PROCEDURE — 94761 N-INVAS EAR/PLS OXIMETRY MLT: CPT

## 2023-12-02 PROCEDURE — 6360000002 HC RX W HCPCS: Performed by: NURSE PRACTITIONER

## 2023-12-02 PROCEDURE — 36600 WITHDRAWAL OF ARTERIAL BLOOD: CPT

## 2023-12-02 PROCEDURE — 94660 CPAP INITIATION&MGMT: CPT

## 2023-12-02 PROCEDURE — 6370000000 HC RX 637 (ALT 250 FOR IP): Performed by: NURSE PRACTITIONER

## 2023-12-02 PROCEDURE — 2580000003 HC RX 258: Performed by: NURSE PRACTITIONER

## 2023-12-02 PROCEDURE — 94640 AIRWAY INHALATION TREATMENT: CPT

## 2023-12-02 PROCEDURE — 85025 COMPLETE CBC W/AUTO DIFF WBC: CPT

## 2023-12-02 PROCEDURE — 2000000000 HC ICU R&B

## 2023-12-02 PROCEDURE — 82803 BLOOD GASES ANY COMBINATION: CPT

## 2023-12-02 PROCEDURE — 80048 BASIC METABOLIC PNL TOTAL CA: CPT

## 2023-12-02 PROCEDURE — 81003 URINALYSIS AUTO W/O SCOPE: CPT

## 2023-12-02 RX ORDER — ENOXAPARIN SODIUM 100 MG/ML
40 INJECTION SUBCUTANEOUS DAILY
Status: DISCONTINUED | OUTPATIENT
Start: 2023-12-02 | End: 2023-12-04 | Stop reason: HOSPADM

## 2023-12-02 RX ORDER — GUAIFENESIN 200 MG/10ML
200 LIQUID ORAL EVERY 4 HOURS PRN
Status: DISCONTINUED | OUTPATIENT
Start: 2023-12-02 | End: 2023-12-04 | Stop reason: HOSPADM

## 2023-12-02 RX ADMIN — WATER 60 MG: 1 INJECTION INTRAMUSCULAR; INTRAVENOUS; SUBCUTANEOUS at 06:45

## 2023-12-02 RX ADMIN — ENOXAPARIN SODIUM 40 MG: 100 INJECTION SUBCUTANEOUS at 08:18

## 2023-12-02 RX ADMIN — AZITHROMYCIN MONOHYDRATE 500 MG: 500 INJECTION, POWDER, LYOPHILIZED, FOR SOLUTION INTRAVENOUS at 19:48

## 2023-12-02 RX ADMIN — ALBUTEROL SULFATE 2.5 MG: 2.5 SOLUTION RESPIRATORY (INHALATION) at 00:36

## 2023-12-02 RX ADMIN — IPRATROPIUM BROMIDE AND ALBUTEROL SULFATE 1 DOSE: .5; 3 SOLUTION RESPIRATORY (INHALATION) at 16:49

## 2023-12-02 RX ADMIN — WATER 60 MG: 1 INJECTION INTRAMUSCULAR; INTRAVENOUS; SUBCUTANEOUS at 15:11

## 2023-12-02 RX ADMIN — IPRATROPIUM BROMIDE AND ALBUTEROL SULFATE 1 DOSE: .5; 3 SOLUTION RESPIRATORY (INHALATION) at 07:21

## 2023-12-02 RX ADMIN — MOMETASONE FUROATE AND FORMOTEROL FUMARATE DIHYDRATE 2 PUFF: 200; 5 AEROSOL RESPIRATORY (INHALATION) at 19:56

## 2023-12-02 RX ADMIN — WATER 60 MG: 1 INJECTION INTRAMUSCULAR; INTRAVENOUS; SUBCUTANEOUS at 22:11

## 2023-12-02 RX ADMIN — IPRATROPIUM BROMIDE AND ALBUTEROL SULFATE 1 DOSE: .5; 3 SOLUTION RESPIRATORY (INHALATION) at 11:45

## 2023-12-02 RX ADMIN — MICONAZOLE NITRATE: 20 CREAM TOPICAL at 22:08

## 2023-12-02 RX ADMIN — MICONAZOLE NITRATE: 20 CREAM TOPICAL at 12:06

## 2023-12-02 RX ADMIN — IPRATROPIUM BROMIDE AND ALBUTEROL SULFATE 1 DOSE: .5; 3 SOLUTION RESPIRATORY (INHALATION) at 19:56

## 2023-12-02 ASSESSMENT — PAIN SCALES - GENERAL
PAINLEVEL_OUTOF10: 0

## 2023-12-02 NOTE — FLOWSHEET NOTE
12/02/23 1141   Vitals   Pulse 75   Heart Rate Source Monitor   Respirations 19   /60   MAP (Calculated) 75   Patient Position Semi fowlers   Cardiac Rhythm Sinus rhythm   Pain Assessment   Pain Assessment None - Denies Pain   Pain Level 0   Opioid-Induced Sedation   POSS Score 1   RASS   [place] Agitation Sedation Scale (RASS) 0   Oxygen Therapy   SpO2 94 %   Pulse Oximetry Type Continuous   Pulse Oximeter Device Mode Continuous   Pulse Oximeter Device Location Left;Hand;Finger   O2 Device High flow nasal cannula  (2L  [place])   Skin Assessment Clean, dry, & intact   FiO2  28 %   O2 Flow Rate (L/min) 2 L/min     TRANSITIONED FROM BIPAP TO NC HIGH FLOW

## 2023-12-02 NOTE — H&P
hypertension on chest CTA, BNP of 16k, mildly elevated trops, and cardiomegaly on chest CT  -s/p lasix 40mg IV in ED. Will not further diurese s she is euvolemic.  -Check Echo. #3: Hypertension:  -hold home amlodipine. She is normotensive. Monitor vitals. VTE prophylaxis: Lovenox Sq  Code status: DNR/DNI  Total time spent: 45 minutes  Plan of care discussed with patient, and nursing staff.

## 2023-12-02 NOTE — ED NOTES
Patient refusing intubation. Patient answering questions appropriately. Dr. Jennifer Rodriguez at the bedside to discuss DNR.       Gigi Alvarez RN  12/01/23 3148

## 2023-12-02 NOTE — ED NOTES
TRANSFER - OUT REPORT:    Verbal report given to Araceli Valentine RN on Annett August  being transferred to ICU for routine progression of patient care       Report consisted of patient's Situation, Background, Assessment and   Recommendations(SBAR). Information from the following report(s) Nurse Handoff Report, ED Encounter Summary, MAR, Recent Results, Neuro Assessment, and Event Log was reviewed with the receiving nurse. [place] Assessment:    Presents to emergency department  because of falls (Syncope, seizure, or loss of consciousness): No  Age > 70: Yes  Altered Mental Status, Intoxication with alcohol or substance confusion (Disorientation, impaired judgment, poor safety awaremess, or inability to follow instructions): No  Impaired Mobility: Ambulates or transfers with assistive devices or assistance; Unable to ambulate or transer.: No  Nursing Judgement: Yes          Lines:   Peripheral IV 12/01/23 Right Antecubital (Active)       Peripheral IV 12/01/23 Left Hand (Active)        Opportunity for questions and clarification was provided.       Patient transported with:  Monitor, O2 @ BiPAPlpm, and Tech           Delores Mccormick  12/01/23 1203

## 2023-12-02 NOTE — ED NOTES
Assumed care of this patient from Dr. Cassia Schmidt. I was asking to review second troponin, CT scan head for confusion and and admit patient. I reviewed the chart and lab and imaging results. Saw patient who was already on BiPAP, she is lethargic but easily arousable. With my initial assessment patient did not seem confused, she was oriented x 3, was able to give me her name, knew where she was, able to give me her birthdate and able to name her son as well. No focal neuro findings.     Also noted on laboratory evaluation BNP 16,000, initial troponin 107,

## 2023-12-02 NOTE — FLOWSHEET NOTE
12/02/23 0732   Vitals   Pulse 67   Heart Rate Source Monitor   Respirations 21   /64   MAP (Calculated) 78   Oxygen Therapy   SpO2 94 %   Pulse Oximeter Device Mode Continuous   Pulse Oximeter Device Location Left;Hand;Finger   O2 Device PAP (positive airway pressure)  (V60 26/10 F18 28%)   Skin Assessment Clean, dry, & intact   Skin Protection for O2 Device Yes   Orientation Anterior;Bilateral   Location Cheek   Intervention(s) Skin Barrier   FiO2  28 %   Vent Settings   Vt (Measured) 718 mL     BIPAP

## 2023-12-02 NOTE — ED NOTES
Assumed care of this patient from Dr. Delon Hager, I was asking to review second troponin, and CT scan of head which was obtained for altered mental status. I reviewed patient's chart, lab results and imaging. Still patient on BiPAP, who appeared in respiratory distress, she was somewhat lethargic but easily aroused, oriented times 3, no focal neurofindings, she attempted to follow commands. She was able to identify son at bedside. Respiratory tech is in room obtaining ABGs. Pulse ox on BiPAP 94 to 95%. General exam, obese female, slightly lethargic. Lungs with some expiratory wheezes. No pedal edema. I reviewed his labs showing BNP of almost 16,000, chest x-ray with questionable bibasilar infiltrates, creatinine is slightly elevated 1.3, troponin also elevated 107 and repeat 109. Patient hypercapnic and hypoxic on initial gas. Ordered repeat gas which was same. Patient given 40 mg Lasix for possible CHF. Also given another DuoNeb. On repeat assessment, patient with pCO2 rising to 79. Respiratory tech reports patient when asleep is doing poorly despite decreasing O2 and increasing rate. Discussed possible intubation with patient's son who advised patient may still be able to make own decisions but he prefers that she be on the ventilator if she desires. Discussed this with patient who did not want to be on the ventilator. She however prefers the BiPAP and conservative treatment of her respiratory failure. She proceeded to sign DNR. She seemed oriented x 3 and able to make her own decisions at this time. Discussed admission with hospitalist Clay NP and patient admitted to ICU. CRITICAL CARE Documentation: This patient is critically ill and there is a high probability of of imminent or life threatening deterioration in the patient's condition without immediate management.     The nature of the patient's clinical problem is: Acute respiratory failure with hypercapnia and hypoxia    I have spent 60

## 2023-12-02 NOTE — PLAN OF CARE
Problem: Skin/Tissue Integrity  Goal: Absence of new skin breakdown  Description: 1. Monitor for areas of redness and/or skin breakdown  2. Assess vascular access sites hourly  3. Every 4-6 hours minimum:  Change oxygen saturation probe site  4. Every 4-6 hours:  If on nasal continuous positive airway pressure, respiratory therapy assess nares and determine need for appliance change or resting period.   Outcome: Progressing     Problem: Discharge Planning  Goal: Discharge to home or other facility with appropriate resources  Outcome: Progressing  Flowsheets  Taken 12/2/2023 0701 by Doroteo Bautista RN  Discharge to home or other facility with appropriate resources:   Identify barriers to discharge with patient and caregiver   Arrange for needed discharge resources and transportation as appropriate  Taken 12/1/2023 2255 by Leonides De León RN  Discharge to home or other facility with appropriate resources: Identify barriers to discharge with patient and caregiver  Taken 12/1/2023 2230 by Leonides De León RN  Discharge to home or other facility with appropriate resources: Identify barriers to discharge with patient and caregiver     Problem: Chronic Conditions and Co-morbidities  Goal: Patient's chronic conditions and co-morbidity symptoms are monitored and maintained or improved  12/2/2023 0918 by Doroteo Bautista RN  Outcome: Progressing  Flowsheets (Taken 12/2/2023 0701)  Care Plan - Patient's Chronic Conditions and Co-Morbidity Symptoms are Monitored and Maintained or Improved:   Monitor and assess patient's chronic conditions and comorbid symptoms for stability, deterioration, or improvement   Collaborate with multidisciplinary team to address chronic and comorbid conditions and prevent exacerbation or deterioration   Update acute care plan with appropriate goals if chronic or comorbid symptoms are exacerbated and prevent overall improvement and discharge  12/1/2023 2244 by Leonides De León

## 2023-12-03 LAB
ANION GAP SERPL CALC-SCNC: 4 MMOL/L (ref 3–18)
BASOPHILS # BLD: 0 K/UL (ref 0–0.1)
BASOPHILS NFR BLD: 0 % (ref 0–2)
BUN SERPL-MCNC: 28 MG/DL (ref 7–18)
BUN/CREAT SERPL: 25 (ref 12–20)
CA-I BLD-MCNC: 8.4 MG/DL (ref 8.5–10.1)
CHLORIDE SERPL-SCNC: 99 MMOL/L (ref 100–111)
CO2 SERPL-SCNC: 37 MMOL/L (ref 21–32)
CREAT SERPL-MCNC: 1.13 MG/DL (ref 0.6–1.3)
DIFFERENTIAL METHOD BLD: ABNORMAL
EOSINOPHIL # BLD: 0 K/UL (ref 0–0.4)
EOSINOPHIL NFR BLD: 0 % (ref 0–5)
ERYTHROCYTE [DISTWIDTH] IN BLOOD BY AUTOMATED COUNT: 14.9 % (ref 11.6–14.5)
GLUCOSE SERPL-MCNC: 123 MG/DL (ref 74–99)
HCT VFR BLD AUTO: 48.4 % (ref 35–45)
HGB BLD-MCNC: 15 G/DL (ref 12–16)
IMM GRANULOCYTES # BLD AUTO: 0 K/UL
IMM GRANULOCYTES NFR BLD AUTO: 0 %
LYMPHOCYTES # BLD: 0.2 K/UL (ref 0.9–3.6)
LYMPHOCYTES NFR BLD: 2 % (ref 21–52)
MCH RBC QN AUTO: 29.8 PG (ref 24–34)
MCHC RBC AUTO-ENTMCNC: 31 G/DL (ref 31–37)
MCV RBC AUTO: 96.2 FL (ref 78–100)
MONOCYTES # BLD: 0.1 K/UL (ref 0.05–1.2)
MONOCYTES NFR BLD: 1 % (ref 3–10)
NEUTS BAND NFR BLD MANUAL: 7 % (ref 0–5)
NEUTS SEG # BLD: 10.1 K/UL (ref 1.8–8)
NEUTS SEG NFR BLD: 90 % (ref 40–73)
NRBC # BLD: 0 K/UL (ref 0–0.01)
NRBC BLD-RTO: 0 PER 100 WBC
PLATELET # BLD AUTO: 165 K/UL (ref 135–420)
PLATELET COMMENT: ABNORMAL
PMV BLD AUTO: 12.1 FL (ref 9.2–11.8)
POTASSIUM SERPL-SCNC: 4.3 MMOL/L (ref 3.5–5.5)
RBC # BLD AUTO: 5.03 M/UL (ref 4.2–5.3)
RBC MORPH BLD: ABNORMAL
SODIUM SERPL-SCNC: 140 MMOL/L (ref 136–145)
WBC # BLD AUTO: 10.4 K/UL (ref 4.6–13.2)
WBC MORPH BLD: ABNORMAL

## 2023-12-03 PROCEDURE — 2700000000 HC OXYGEN THERAPY PER DAY

## 2023-12-03 PROCEDURE — 2000000000 HC ICU R&B

## 2023-12-03 PROCEDURE — 2580000003 HC RX 258: Performed by: INTERNAL MEDICINE

## 2023-12-03 PROCEDURE — 94761 N-INVAS EAR/PLS OXIMETRY MLT: CPT

## 2023-12-03 PROCEDURE — 6360000002 HC RX W HCPCS: Performed by: INTERNAL MEDICINE

## 2023-12-03 PROCEDURE — 6370000000 HC RX 637 (ALT 250 FOR IP): Performed by: NURSE PRACTITIONER

## 2023-12-03 PROCEDURE — 85025 COMPLETE CBC W/AUTO DIFF WBC: CPT

## 2023-12-03 PROCEDURE — 6370000000 HC RX 637 (ALT 250 FOR IP): Performed by: INTERNAL MEDICINE

## 2023-12-03 PROCEDURE — 80048 BASIC METABOLIC PNL TOTAL CA: CPT

## 2023-12-03 PROCEDURE — 36415 COLL VENOUS BLD VENIPUNCTURE: CPT

## 2023-12-03 PROCEDURE — 94660 CPAP INITIATION&MGMT: CPT

## 2023-12-03 PROCEDURE — 2580000003 HC RX 258: Performed by: NURSE PRACTITIONER

## 2023-12-03 PROCEDURE — 6360000002 HC RX W HCPCS: Performed by: NURSE PRACTITIONER

## 2023-12-03 PROCEDURE — 94640 AIRWAY INHALATION TREATMENT: CPT

## 2023-12-03 RX ORDER — PROCHLORPERAZINE EDISYLATE 5 MG/ML
10 INJECTION INTRAMUSCULAR; INTRAVENOUS ONCE
Status: DISCONTINUED | OUTPATIENT
Start: 2023-12-03 | End: 2023-12-03

## 2023-12-03 RX ORDER — PROCHLORPERAZINE EDISYLATE 5 MG/ML
10 INJECTION INTRAMUSCULAR; INTRAVENOUS EVERY 6 HOURS PRN
Status: DISCONTINUED | OUTPATIENT
Start: 2023-12-03 | End: 2023-12-03

## 2023-12-03 RX ORDER — IPRATROPIUM BROMIDE AND ALBUTEROL SULFATE 2.5; .5 MG/3ML; MG/3ML
1 SOLUTION RESPIRATORY (INHALATION) 3 TIMES DAILY
Status: DISCONTINUED | OUTPATIENT
Start: 2023-12-03 | End: 2023-12-04 | Stop reason: HOSPADM

## 2023-12-03 RX ADMIN — WATER 40 MG: 1 INJECTION INTRAMUSCULAR; INTRAVENOUS; SUBCUTANEOUS at 14:33

## 2023-12-03 RX ADMIN — IPRATROPIUM BROMIDE AND ALBUTEROL SULFATE 1 DOSE: .5; 3 SOLUTION RESPIRATORY (INHALATION) at 13:24

## 2023-12-03 RX ADMIN — MOMETASONE FUROATE AND FORMOTEROL FUMARATE DIHYDRATE 2 PUFF: 200; 5 AEROSOL RESPIRATORY (INHALATION) at 19:11

## 2023-12-03 RX ADMIN — WATER 60 MG: 1 INJECTION INTRAMUSCULAR; INTRAVENOUS; SUBCUTANEOUS at 06:29

## 2023-12-03 RX ADMIN — IPRATROPIUM BROMIDE AND ALBUTEROL SULFATE 1 DOSE: .5; 3 SOLUTION RESPIRATORY (INHALATION) at 07:52

## 2023-12-03 RX ADMIN — AZITHROMYCIN MONOHYDRATE 500 MG: 500 INJECTION, POWDER, LYOPHILIZED, FOR SOLUTION INTRAVENOUS at 20:24

## 2023-12-03 RX ADMIN — MICONAZOLE NITRATE 1 APPLICATION: 20 CREAM TOPICAL at 20:25

## 2023-12-03 RX ADMIN — MOMETASONE FUROATE AND FORMOTEROL FUMARATE DIHYDRATE 2 PUFF: 200; 5 AEROSOL RESPIRATORY (INHALATION) at 07:52

## 2023-12-03 RX ADMIN — WATER 40 MG: 1 INJECTION INTRAMUSCULAR; INTRAVENOUS; SUBCUTANEOUS at 23:09

## 2023-12-03 RX ADMIN — MICONAZOLE NITRATE: 20 CREAM TOPICAL at 09:51

## 2023-12-03 RX ADMIN — ENOXAPARIN SODIUM 40 MG: 100 INJECTION SUBCUTANEOUS at 09:50

## 2023-12-03 RX ADMIN — IPRATROPIUM BROMIDE AND ALBUTEROL SULFATE 1 DOSE: .5; 3 SOLUTION RESPIRATORY (INHALATION) at 19:11

## 2023-12-03 ASSESSMENT — PAIN SCALES - GENERAL
PAINLEVEL_OUTOF10: 0

## 2023-12-03 NOTE — PLAN OF CARE
Problem: Skin/Tissue Integrity  Goal: Absence of new skin breakdown  Description: 1. Monitor for areas of redness and/or skin breakdown  2. Assess vascular access sites hourly  3. Every 4-6 hours minimum:  Change oxygen saturation probe site  4. Every 4-6 hours:  If on nasal continuous positive airway pressure, respiratory therapy assess nares and determine need for appliance change or resting period.   Outcome: Progressing     Problem: Discharge Planning  Goal: Discharge to home or other facility with appropriate resources  Outcome: Progressing  Flowsheets (Taken 12/3/2023 0715)  Discharge to home or other facility with appropriate resources:   Identify barriers to discharge with patient and caregiver   Arrange for needed discharge resources and transportation as appropriate     Problem: Chronic Conditions and Co-morbidities  Goal: Patient's chronic conditions and co-morbidity symptoms are monitored and maintained or improved  Outcome: Progressing  Flowsheets (Taken 12/3/2023 0715)  Care Plan - Patient's Chronic Conditions and Co-Morbidity Symptoms are Monitored and Maintained or Improved:   Monitor and assess patient's chronic conditions and comorbid symptoms for stability, deterioration, or improvement   Collaborate with multidisciplinary team to address chronic and comorbid conditions and prevent exacerbation or deterioration     Problem: Safety - Adult  Goal: Free from fall injury  Outcome: Progressing

## 2023-12-03 NOTE — ACP (ADVANCE CARE PLANNING)
Advance Care Planning     General Advance Care Planning (ACP) Conversation    Date of Conversation: 12/1/2023  Conducted with: Patient with Decision Making Capacity    Healthcare Decision Maker:    Primary Decision Maker: Teenasubha Grimaldo - Child - [phone]  Click here to complete Healthcare Decision Makers including selection of the Healthcare Decision Maker Relationship (ie \"Primary\"). Has ACP document(s) on file - reflects the patient's care preferences    Content/Action Overview:   Has ACP document(s) on file - reflects the patient's care preferences  Reviewed DNR/DNI and patient confirms current DNR status - completed forms on file (place new order if needed)        Length of Voluntary ACP Conversation in minutes:  <16 minutes (Non-Billable)    Deepa Georges

## 2023-12-03 NOTE — CARE COORDINATION
Case Management Assessment  Initial Evaluation    Date/Time of Evaluation: 12/2/2023 3:09 PM  Assessment Completed by: Sukh Burkett    If patient is discharged prior to next notation, then this note serves as note for discharge by case management. Patient Name: Juan Flynn                   YOB: 1955  Diagnosis: Encephalopathy acute [G93.40]  COPD exacerbation ([address]) [J44.1]  Acute respiratory failure with hypoxia and hypercapnia ([address]) [J96.01, J96.02]                   Date / Time: 12/1/2023  6:01 PM    Patient Admission Status: Inpatient   Readmission Risk (Low < 19, Mod (19-27), High > 27): Readmission Risk Score: 11.7    Current PCP: Trinidad Piper MD  PCP verified by CM? Yes    Chart Reviewed: Yes      History Provided by: Child/Family  Patient Orientation: Alert and Oriented    Patient Cognition: Alert    Hospitalization in the last 30 days (Readmission):  No    If yes, Readmission Assessment in CM Navigator will be completed. Advance Directives:      Code Status: DNR   Patient's Primary Decision Maker is: Legal Next of Kin    Primary Decision MakerLanaeddie Pizarro Child - [phone]    Discharge Planning:    Patient lives with: Children Type of Home: House  Primary Care Giver: Self  Patient Support Systems include: Children   Current Financial resources: Medicare  Current community resources: None  Current services prior to admission: None            Current DME:              Type of Home Care services:  None    ADLS  Prior functional level: Independent in ADLs/IADLs  Current functional level: Independent in ADLs/IADLs    PT AM-PAC:   /24  OT AM-PAC:   /24    Family can provide assistance at DC: Yes  Would you like Case Management to discuss the discharge plan with any other family members/significant others, and if so, who?  Yes  Plans to Return to Present Housing: Yes  Other Identified Issues/Barriers to RETURNING to current housing: YES  Potential Assistance needed at

## 2023-12-04 ENCOUNTER — APPOINTMENT (OUTPATIENT)
Age: 68
DRG: 189 | End: 2023-12-04
Payer: MEDICARE

## 2023-12-04 VITALS
SYSTOLIC BLOOD PRESSURE: 99 MMHG | HEIGHT: 68 IN | DIASTOLIC BLOOD PRESSURE: 59 MMHG | BODY MASS INDEX: 34.1 KG/M2 | RESPIRATION RATE: 18 BRPM | TEMPERATURE: 98.1 F | OXYGEN SATURATION: 87 % | HEART RATE: 80 BPM | WEIGHT: 225 LBS

## 2023-12-04 LAB
ANION GAP SERPL CALC-SCNC: 5 MMOL/L (ref 3–18)
BASOPHILS # BLD: 0 K/UL (ref 0–0.1)
BASOPHILS NFR BLD: 0 % (ref 0–2)
BUN SERPL-MCNC: 30 MG/DL (ref 7–18)
BUN/CREAT SERPL: 26 (ref 12–20)
CA-I BLD-MCNC: 8.4 MG/DL (ref 8.5–10.1)
CHLORIDE SERPL-SCNC: 98 MMOL/L (ref 100–111)
CO2 SERPL-SCNC: 38 MMOL/L (ref 21–32)
CREAT SERPL-MCNC: 1.14 MG/DL (ref 0.6–1.3)
DIFFERENTIAL METHOD BLD: ABNORMAL
EOSINOPHIL # BLD: 0 K/UL (ref 0–0.4)
EOSINOPHIL NFR BLD: 0 % (ref 0–5)
ERYTHROCYTE [DISTWIDTH] IN BLOOD BY AUTOMATED COUNT: 15 % (ref 11.6–14.5)
GLUCOSE SERPL-MCNC: 101 MG/DL (ref 74–99)
HCT VFR BLD AUTO: 48.7 % (ref 35–45)
HGB BLD-MCNC: 15.1 G/DL (ref 12–16)
IMM GRANULOCYTES # BLD AUTO: 0.1 K/UL (ref 0–0.04)
IMM GRANULOCYTES NFR BLD AUTO: 1 % (ref 0–0.5)
LYMPHOCYTES # BLD: 0.2 K/UL (ref 0.9–3.6)
LYMPHOCYTES NFR BLD: 3 % (ref 21–52)
MCH RBC QN AUTO: 29.8 PG (ref 24–34)
MCHC RBC AUTO-ENTMCNC: 31 G/DL (ref 31–37)
MCV RBC AUTO: 96.1 FL (ref 78–100)
MONOCYTES # BLD: 0.1 K/UL (ref 0.05–1.2)
MONOCYTES NFR BLD: 1 % (ref 3–10)
NEUTS SEG # BLD: 8.7 K/UL (ref 1.8–8)
NEUTS SEG NFR BLD: 95 % (ref 40–73)
NRBC # BLD: 0 K/UL (ref 0–0.01)
NRBC BLD-RTO: 0 PER 100 WBC
PLATELET # BLD AUTO: 150 K/UL (ref 135–420)
PMV BLD AUTO: 12 FL (ref 9.2–11.8)
POTASSIUM SERPL-SCNC: 4.3 MMOL/L (ref 3.5–5.5)
RBC # BLD AUTO: 5.07 M/UL (ref 4.2–5.3)
SODIUM SERPL-SCNC: 141 MMOL/L (ref 136–145)
WBC # BLD AUTO: 9.1 K/UL (ref 4.6–13.2)

## 2023-12-04 PROCEDURE — 94640 AIRWAY INHALATION TREATMENT: CPT

## 2023-12-04 PROCEDURE — 94761 N-INVAS EAR/PLS OXIMETRY MLT: CPT

## 2023-12-04 PROCEDURE — 2700000000 HC OXYGEN THERAPY PER DAY

## 2023-12-04 PROCEDURE — 97530 THERAPEUTIC ACTIVITIES: CPT

## 2023-12-04 PROCEDURE — 85025 COMPLETE CBC W/AUTO DIFF WBC: CPT

## 2023-12-04 PROCEDURE — 94618 PULMONARY STRESS TESTING: CPT

## 2023-12-04 PROCEDURE — 6360000002 HC RX W HCPCS: Performed by: NURSE PRACTITIONER

## 2023-12-04 PROCEDURE — 6370000000 HC RX 637 (ALT 250 FOR IP): Performed by: INTERNAL MEDICINE

## 2023-12-04 PROCEDURE — 6370000000 HC RX 637 (ALT 250 FOR IP): Performed by: NURSE PRACTITIONER

## 2023-12-04 PROCEDURE — 97161 PT EVAL LOW COMPLEX 20 MIN: CPT

## 2023-12-04 PROCEDURE — 36415 COLL VENOUS BLD VENIPUNCTURE: CPT

## 2023-12-04 PROCEDURE — 6360000002 HC RX W HCPCS: Performed by: INTERNAL MEDICINE

## 2023-12-04 PROCEDURE — 2580000003 HC RX 258: Performed by: INTERNAL MEDICINE

## 2023-12-04 PROCEDURE — 80048 BASIC METABOLIC PNL TOTAL CA: CPT

## 2023-12-04 PROCEDURE — 93306 TTE W/DOPPLER COMPLETE: CPT

## 2023-12-04 RX ORDER — PREDNISONE 10 MG/1
TABLET ORAL
Qty: 1 EACH | Refills: 0 | Status: SHIPPED | OUTPATIENT
Start: 2023-12-04

## 2023-12-04 RX ORDER — NEBULIZER ACCESSORIES
1 KIT MISCELLANEOUS DAILY PRN
Qty: 1 KIT | Refills: 0 | Status: SHIPPED | OUTPATIENT
Start: 2023-12-04

## 2023-12-04 RX ORDER — AZITHROMYCIN 250 MG/1
250 TABLET, FILM COATED ORAL DAILY
Qty: 10 TABLET | Refills: 0 | Status: SHIPPED | OUTPATIENT
Start: 2023-12-04 | End: 2023-12-14

## 2023-12-04 RX ORDER — IPRATROPIUM BROMIDE AND ALBUTEROL SULFATE 2.5; .5 MG/3ML; MG/3ML
3 SOLUTION RESPIRATORY (INHALATION) EVERY 4 HOURS PRN
Qty: 60 EACH | Refills: 0 | Status: SHIPPED | OUTPATIENT
Start: 2023-12-04

## 2023-12-04 RX ADMIN — MICONAZOLE NITRATE: 20 CREAM TOPICAL at 09:41

## 2023-12-04 RX ADMIN — WATER 40 MG: 1 INJECTION INTRAMUSCULAR; INTRAVENOUS; SUBCUTANEOUS at 15:57

## 2023-12-04 RX ADMIN — IPRATROPIUM BROMIDE AND ALBUTEROL SULFATE 1 DOSE: .5; 3 SOLUTION RESPIRATORY (INHALATION) at 08:12

## 2023-12-04 RX ADMIN — WATER 40 MG: 1 INJECTION INTRAMUSCULAR; INTRAVENOUS; SUBCUTANEOUS at 06:12

## 2023-12-04 RX ADMIN — ENOXAPARIN SODIUM 40 MG: 100 INJECTION SUBCUTANEOUS at 09:42

## 2023-12-04 RX ADMIN — MOMETASONE FUROATE AND FORMOTEROL FUMARATE DIHYDRATE 2 PUFF: 200; 5 AEROSOL RESPIRATORY (INHALATION) at 08:12

## 2023-12-04 ASSESSMENT — PAIN SCALES - GENERAL
PAINLEVEL_OUTOF10: 0
PAINLEVEL_OUTOF10: 0

## 2023-12-04 NOTE — DISCHARGE SUMMARY
Comments: Please substitute for insurance preference- patient prefers ventolin brand name  Associated Diagnoses: Chronic obstructive pulmonary disease, unspecified COPD type ([address])               NOTIFY YOUR PHYSICIAN FOR ANY OF THE FOLLOWING:   Fever over 101 degrees for 24 hours. Chest pain, shortness of breath, fever, chills, nausea, vomiting, diarrhea, change in mentation, falling, weakness, bleeding. Severe pain or pain not relieved by medications. Or, any other signs or symptoms that you may have questions about. DISPOSITION:home w/ hh    Home With:   OT  PT  HH  RN       Long term SNF/Inpatient Rehab    Independent/assisted living    Hospice    Other:       PATIENT CONDITION AT DISCHARGE:     Functional status   x Poor     Deconditioned     Independent      Cognition    x Lucid     Forgetful     Dementia      Catheters/lines (plus indication)    [place]     PICC     PEG    x None      Code status     Full code    x DNR      PHYSICAL EXAMINATION AT DISCHARGE:  General:          Alert, cooperative, no distress, appears stated age. HEENT:           Atraumatic, anicteric sclerae, pink conjunctivae                          No oral ulcers, mucosa moist, throat clear, dentition fair  Neck:               Supple, symmetrical  Lungs:             end exp wheeze  Chest wall:      No tenderness  No Accessory muscle use. Heart:              Regular  rhythm,  No  murmur   No edema  Abdomen:        Soft, non-tender. Not distended. Bowel sounds normal  Extremities:     No cyanosis. No clubbing,                            Skin turgor normal, Capillary refill normal  Skin:                Not pale. Not Jaundiced  No rashes   Psych:             Not anxious or agitated.   Neurologic:      Alert, moves all extremities, answers questions appropriately and responds to commands       CHRONIC MEDICAL DIAGNOSES:      Greater than 35 minutes were spent with the patient on counseling and coordination of care    Signed:   Mayank Lubin

## 2023-12-04 NOTE — PLAN OF CARE
Problem: Skin/Tissue Integrity  Goal: Absence of new skin breakdown  Description: 1. Monitor for areas of redness and/or skin breakdown  2. Assess vascular access sites hourly  3. Every 4-6 hours minimum:  Change oxygen saturation probe site  4. Every 4-6 hours:  If on nasal continuous positive airway pressure, respiratory therapy assess nares and determine need for appliance change or resting period.   Outcome: Adequate for Discharge     Problem: Discharge Planning  Goal: Discharge to home or other facility with appropriate resources  Outcome: Adequate for Discharge     Problem: Chronic Conditions and Co-morbidities  Goal: Patient's chronic conditions and co-morbidity symptoms are monitored and maintained or improved  Outcome: Adequate for Discharge     Problem: Safety - Adult  Goal: Free from fall injury  Outcome: Adequate for Discharge     Problem: ABCDS Injury Assessment  Goal: Absence of physical injury  Outcome: Adequate for Discharge

## 2023-12-04 NOTE — THERAPY EVALUATION
PHYSICAL THERAPY EVALUATION    Patient: Deniz Castillo (61 y.o. female)  Date: 12/4/2023  Physical Therapy Time:   PT Individual Minutes  Time In: 0765  Time Out: 2337  Minutes: 24  Timed Minute Breakdown:  12 eval 12 TA    Primary Diagnosis: Encephalopathy acute [G93.40]  COPD exacerbation (HCC) [J44.1]  Acute respiratory failure with hypoxia and hypercapnia (HCC) [J96.01, J96.02] Acute respiratory failure with hypoxia and hypercapnia (HCC)  Present on Admission:   Acute respiratory failure with hypoxia and hypercapnia (HCC)        Precautions:          Assessment: Patient was found alert in bed. The patient completed all transfers independently. The patient ambulated 80 feet with RW with SBA. Patient had complaints of fatigue and needing to sit with ambulation. Patient SpO2 was 89% when returning to the room and SpO2 returned to >92% with rest.  Performance Deficits/Impairments: Decreased strength;Decreased endurance;Decreased balance;Decreased functional mobility   Treatment Diagnosis: Difficulty walking  Therapy Prognosis: Fair  Decision Making: Low Complexity  Discharge Recommendations: Home independently    Conditions Requiring skilled therapeutic intervention:  Performance Deficits/Impairments: Decreased strength;Decreased endurance;Decreased balance;Decreased functional mobility      Evaluation Activity Tolerance:   Activity Tolerance  Activity Tolerance: Patient limited by fatigue;Patient limited by endurance    Equipment Recommendations for Discharge:  PT Equipment Recommendations  Equipment Needed: No    AM-PAC  AM-PAC Inpatient Mobility Raw Score : 24; Current research shows that an AM-PAC score of 17 or less is typically not associated with a discharge to the patient's home setting, whereas a score of 18 or greater is typically associated with a discharge to the patient's home setting.     Factors which may influence rehabilitation potential include:  Medical condition/comorbidities    PLAN :  Physical

## 2023-12-04 NOTE — CARE COORDINATION
In to see pt and discuss DC needs and need for 02 at DC. Son will be home to accept 02 and portable to be brought hospital room. Son will come and pick pt up after home 02 delivered. 1530  Portable delivered, Giuliana called and verified address for pt and will call son for delivery.

## 2023-12-05 ENCOUNTER — TELEPHONE (OUTPATIENT)
Facility: CLINIC | Age: 68
End: 2023-12-05

## 2023-12-05 LAB
ECHO AO ASC DIAM: 2.9 CM
ECHO AO ASCENDING AORTA INDEX: 1.35 CM/M2
ECHO AO ROOT DIAM: 2.8 CM
ECHO AO ROOT INDEX: 1.3 CM/M2
ECHO AV AREA PEAK VELOCITY: 2.6 CM2
ECHO AV AREA VTI: 2.6 CM2
ECHO AV AREA/BSA PEAK VELOCITY: 1.2 CM2/M2
ECHO AV AREA/BSA VTI: 1.2 CM2/M2
ECHO AV MEAN GRADIENT: 5 MMHG
ECHO AV MEAN VELOCITY: 1.1 M/S
ECHO AV PEAK GRADIENT: 11 MMHG
ECHO AV PEAK VELOCITY: 1.7 M/S
ECHO AV VELOCITY RATIO: 0.59
ECHO AV VTI: 32.8 CM
ECHO BSA: 2.21 M2
ECHO EST RA PRESSURE: 8 MMHG
ECHO LA DIAMETER INDEX: 1.81 CM/M2
ECHO LA DIAMETER: 3.9 CM
ECHO LA TO AORTIC ROOT RATIO: 1.39
ECHO LA VOL A-L A2C: 77 ML (ref 22–52)
ECHO LA VOL A-L A4C: 77 ML (ref 22–52)
ECHO LA VOL BP: 69 ML (ref 22–52)
ECHO LA VOL MOD A2C: 70 ML (ref 22–52)
ECHO LA VOL MOD A4C: 68 ML (ref 22–52)
ECHO LA VOL/BSA BIPLANE: 32 ML/M2 (ref 16–34)
ECHO LA VOLUME AREA LENGTH: 77 ML
ECHO LA VOLUME INDEX A-L A2C: 36 ML/M2 (ref 16–34)
ECHO LA VOLUME INDEX A-L A4C: 36 ML/M2 (ref 16–34)
ECHO LA VOLUME INDEX AREA LENGTH: 36 ML/M2 (ref 16–34)
ECHO LA VOLUME INDEX MOD A2C: 33 ML/M2 (ref 16–34)
ECHO LA VOLUME INDEX MOD A4C: 32 ML/M2 (ref 16–34)
ECHO LV E' LATERAL VELOCITY: 7 CM/S
ECHO LV E' SEPTAL VELOCITY: 7 CM/S
ECHO LV FRACTIONAL SHORTENING: 43 % (ref 28–44)
ECHO LV INTERNAL DIMENSION DIASTOLE INDEX: 2.19 CM/M2
ECHO LV INTERNAL DIMENSION DIASTOLIC: 4.7 CM (ref 3.9–5.3)
ECHO LV INTERNAL DIMENSION SYSTOLIC INDEX: 1.26 CM/M2
ECHO LV INTERNAL DIMENSION SYSTOLIC: 2.7 CM
ECHO LV IVSD: 1 CM (ref 0.6–0.9)
ECHO LV MASS 2D: 164.5 G (ref 67–162)
ECHO LV MASS INDEX 2D: 76.5 G/M2 (ref 43–95)
ECHO LV POSTERIOR WALL DIASTOLIC: 1 CM (ref 0.6–0.9)
ECHO LV RELATIVE WALL THICKNESS RATIO: 0.43
ECHO LVOT AREA: 4.2 CM2
ECHO LVOT AV VTI INDEX: 0.62
ECHO LVOT DIAM: 2.3 CM
ECHO LVOT MEAN GRADIENT: 2 MMHG
ECHO LVOT PEAK GRADIENT: 4 MMHG
ECHO LVOT PEAK VELOCITY: 1 M/S
ECHO LVOT STROKE VOLUME INDEX: 39.4 ML/M2
ECHO LVOT SV: 84.7 ML
ECHO LVOT VTI: 20.4 CM
ECHO MV A VELOCITY: 1.22 M/S
ECHO MV AREA PHT: 5.1 CM2
ECHO MV AREA VTI: 2.7 CM2
ECHO MV E DECELERATION TIME (DT): 188 MS
ECHO MV E VELOCITY: 1.26 M/S
ECHO MV E/A RATIO: 1.03
ECHO MV E/E' LATERAL: 18
ECHO MV E/E' RATIO (AVERAGED): 18
ECHO MV LVOT VTI INDEX: 1.54
ECHO MV MAX VELOCITY: 1.4 M/S
ECHO MV MEAN GRADIENT: 3 MMHG
ECHO MV MEAN VELOCITY: 0.8 M/S
ECHO MV PEAK GRADIENT: 8 MMHG
ECHO MV PRESSURE HALF TIME (PHT): 43.2 MS
ECHO MV VTI: 31.4 CM
ECHO PV MAX VELOCITY: 1 M/S
ECHO PV PEAK GRADIENT: 4 MMHG
ECHO RA END SYSTOLIC VOLUME APICAL 4 CHAMBER INDEX BSA: 25 ML/M2
ECHO RA VOLUME BIPLANE METHOD OF DISKS: 52 ML
ECHO RA VOLUME INDEX BP: 24 ML/M2
ECHO RA VOLUME: 54 ML
ECHO RIGHT VENTRICULAR SYSTOLIC PRESSURE (RVSP): 45 MMHG
ECHO RV GLOBAL SYSTOLIC STRAIN (GLS): -18.6 %
ECHO RV TAPSE: 1.8 CM (ref 1.7–?)
ECHO TV REGURGITANT MAX VELOCITY: 3.05 M/S
ECHO TV REGURGITANT PEAK GRADIENT: 37 MMHG

## 2023-12-05 NOTE — TELEPHONE ENCOUNTER
Care Transitions Initial Follow Up Call    Outreach made within 2 business days of discharge: Yes    Patient: Donn Guardado Patient : 1955   MRN: [id number]  Reason for Admission: There are no discharge diagnoses documented for the most recent discharge. Discharge Date: 23       Spoke with: patient    Discharge department/facility: [facility] Interactive Patient Contact:  Was patient able to fill all prescriptions: No: possibly needs advair inhaler filled, never picked up the new dose in September that she remembers but she needs to ask her friend   Was patient instructed to bring all medications to the follow-up visit: Yes  Is patient taking all medications as directed in the discharge summary?  No- holding amlodipine due to low BP in hospital  Does patient understand their discharge instructions: Yes  Does patient have questions or concerns that need addressed prior to 7-14 day follow up office visit: no    Scheduled appointment with PCP within 7-14 days    Follow Up  Future Appointments   Date Time Provider 4600  [address]   2023  1:00 PM Erasmo Malhotra MD [facility] CELINE SIERAR   2023  2:30 PM Erasmo Malhotra MD [facility] BS RIGO Rosales LPN

## 2023-12-07 LAB
BACTERIA SPEC CULT: NORMAL
BACTERIA SPEC CULT: NORMAL
Lab: NORMAL
Lab: NORMAL

## 2023-12-14 ENCOUNTER — OFFICE VISIT (OUTPATIENT)
Facility: CLINIC | Age: 68
End: 2023-12-14

## 2023-12-14 VITALS
OXYGEN SATURATION: 96 % | TEMPERATURE: 97.5 F | DIASTOLIC BLOOD PRESSURE: 78 MMHG | SYSTOLIC BLOOD PRESSURE: 128 MMHG | RESPIRATION RATE: 18 BRPM | HEART RATE: 78 BPM

## 2023-12-14 DIAGNOSIS — J96.12 CHRONIC HYPERCAPNIC RESPIRATORY FAILURE (HCC): ICD-10-CM

## 2023-12-14 DIAGNOSIS — R60.0 BILATERAL LOWER EXTREMITY EDEMA: ICD-10-CM

## 2023-12-14 DIAGNOSIS — Z09 HOSPITAL DISCHARGE FOLLOW-UP: Primary | ICD-10-CM

## 2023-12-14 DIAGNOSIS — J44.9 CHRONIC OBSTRUCTIVE PULMONARY DISEASE, UNSPECIFIED COPD TYPE (HCC): ICD-10-CM

## 2023-12-14 RX ORDER — FUROSEMIDE 20 MG/1
20 TABLET ORAL DAILY
Qty: 90 TABLET | Refills: 0 | Status: SHIPPED | OUTPATIENT
Start: 2023-12-14

## 2023-12-14 RX ORDER — FLUTICASONE FUROATE, UMECLIDINIUM BROMIDE AND VILANTEROL TRIFENATATE 200; 62.5; 25 UG/1; UG/1; UG/1
1 POWDER RESPIRATORY (INHALATION) DAILY
Qty: 60 EACH | Refills: 3 | Status: SHIPPED | OUTPATIENT
Start: 2023-12-14

## 2023-12-14 SDOH — ECONOMIC STABILITY: FOOD INSECURITY: WITHIN THE PAST 12 MONTHS, THE FOOD YOU BOUGHT JUST DIDN'T LAST AND YOU DIDN'T HAVE MONEY TO GET MORE.: PATIENT DECLINED

## 2023-12-14 SDOH — ECONOMIC STABILITY: INCOME INSECURITY: HOW HARD IS IT FOR YOU TO PAY FOR THE VERY BASICS LIKE FOOD, HOUSING, MEDICAL CARE, AND HEATING?: PATIENT DECLINED

## 2023-12-14 SDOH — ECONOMIC STABILITY: FOOD INSECURITY: WITHIN THE PAST 12 MONTHS, YOU WORRIED THAT YOUR FOOD WOULD RUN OUT BEFORE YOU GOT MONEY TO BUY MORE.: PATIENT DECLINED

## 2023-12-14 SDOH — ECONOMIC STABILITY: HOUSING INSECURITY
IN THE LAST 12 MONTHS, WAS THERE A TIME WHEN YOU DID NOT HAVE A STEADY PLACE TO SLEEP OR SLEPT IN A SHELTER (INCLUDING NOW)?: PATIENT REFUSED

## 2023-12-14 NOTE — PATIENT INSTRUCTIONS
- continue steroid until all gone  - stop advair, start trelegy inhaler; have pharmacy show you how to use it (health department also a good resource)  - will refer you to pulmonologist. They will call you to set up an appointment; please call in 1 week if you have not heard from them  - start taking lasix 20 mg daily for swelling in the legs.  It will make you urinate.  - good job on quitting smoking!  - follow up with me in about 1 month

## 2023-12-20 ENCOUNTER — TELEPHONE (OUTPATIENT)
Facility: CLINIC | Age: 68
End: 2023-12-20

## 2023-12-20 NOTE — TELEPHONE ENCOUNTER
Patient called requesting her referral to pulmonary be sent to her hometown [place], [place]. She gave me the phone number to the office.  [phone], fax # [phone]

## 2023-12-22 ENCOUNTER — TELEPHONE (OUTPATIENT)
Facility: CLINIC | Age: 68
End: 2023-12-22

## 2024-04-02 DIAGNOSIS — I10 PRIMARY HYPERTENSION: ICD-10-CM

## 2024-04-02 RX ORDER — AMLODIPINE BESYLATE 5 MG/1
5 TABLET ORAL DAILY
Qty: 90 TABLET | Refills: 3 | Status: SHIPPED | OUTPATIENT
Start: 2024-04-02

## 2025-04-02 ENCOUNTER — OFFICE VISIT (OUTPATIENT)
Facility: CLINIC | Age: 70
End: 2025-04-02

## 2025-04-02 VITALS
TEMPERATURE: 97.3 F | SYSTOLIC BLOOD PRESSURE: 128 MMHG | DIASTOLIC BLOOD PRESSURE: 86 MMHG | RESPIRATION RATE: 18 BRPM | HEART RATE: 82 BPM | HEIGHT: 68 IN | BODY MASS INDEX: 34.21 KG/M2 | OXYGEN SATURATION: 97 %

## 2025-04-02 DIAGNOSIS — Z79.899 ENCOUNTER FOR LONG-TERM (CURRENT) USE OF MEDICATIONS: ICD-10-CM

## 2025-04-02 DIAGNOSIS — N18.31 CHRONIC KIDNEY DISEASE, STAGE 3A (HCC): ICD-10-CM

## 2025-04-02 DIAGNOSIS — I50.32 CHRONIC DIASTOLIC CONGESTIVE HEART FAILURE (HCC): ICD-10-CM

## 2025-04-02 DIAGNOSIS — J44.9 CHRONIC OBSTRUCTIVE PULMONARY DISEASE, UNSPECIFIED COPD TYPE (HCC): ICD-10-CM

## 2025-04-02 DIAGNOSIS — R73.03 PREDIABETES: Primary | ICD-10-CM

## 2025-04-02 DIAGNOSIS — R60.0 BILATERAL LOWER EXTREMITY EDEMA: ICD-10-CM

## 2025-04-02 DIAGNOSIS — J96.12 CHRONIC HYPERCAPNIC RESPIRATORY FAILURE: ICD-10-CM

## 2025-04-02 DIAGNOSIS — I10 PRIMARY HYPERTENSION: ICD-10-CM

## 2025-04-02 DIAGNOSIS — Z13.220 SCREENING FOR LIPID DISORDERS: ICD-10-CM

## 2025-04-02 LAB — HBA1C MFR BLD: 6.7 %

## 2025-04-02 RX ORDER — FUROSEMIDE 20 MG/1
20 TABLET ORAL DAILY
Qty: 90 TABLET | Refills: 0 | Status: CANCELLED | OUTPATIENT
Start: 2025-04-02

## 2025-04-02 RX ORDER — FLUTICASONE FUROATE, UMECLIDINIUM BROMIDE AND VILANTEROL TRIFENATATE 200; 62.5; 25 UG/1; UG/1; UG/1
1 POWDER RESPIRATORY (INHALATION) DAILY
Qty: 60 EACH | Refills: 3 | Status: CANCELLED | OUTPATIENT
Start: 2025-04-02

## 2025-04-02 RX ORDER — FUROSEMIDE 20 MG/1
20 TABLET ORAL DAILY
Qty: 30 TABLET | Refills: 0 | Status: SHIPPED | OUTPATIENT
Start: 2025-04-02

## 2025-04-02 RX ORDER — CLOTRIMAZOLE AND BETAMETHASONE DIPROPIONATE 10; .64 MG/G; MG/G
CREAM TOPICAL
Qty: 15 G | Refills: 0 | Status: SHIPPED | OUTPATIENT
Start: 2025-04-02

## 2025-04-02 RX ORDER — AMLODIPINE BESYLATE 5 MG/1
5 TABLET ORAL DAILY
Qty: 90 TABLET | Refills: 0 | Status: SHIPPED | OUTPATIENT
Start: 2025-04-02

## 2025-04-02 SDOH — ECONOMIC STABILITY: FOOD INSECURITY: WITHIN THE PAST 12 MONTHS, THE FOOD YOU BOUGHT JUST DIDN'T LAST AND YOU DIDN'T HAVE MONEY TO GET MORE.: NEVER TRUE

## 2025-04-02 SDOH — ECONOMIC STABILITY: FOOD INSECURITY: WITHIN THE PAST 12 MONTHS, YOU WORRIED THAT YOUR FOOD WOULD RUN OUT BEFORE YOU GOT MONEY TO BUY MORE.: NEVER TRUE

## 2025-04-02 ASSESSMENT — ENCOUNTER SYMPTOMS
WHEEZING: 1
CHEST TIGHTNESS: 0
SHORTNESS OF BREATH: 1
EYES NEGATIVE: 1
COUGH: 0
NAUSEA: 0
VOMITING: 0

## 2025-04-02 ASSESSMENT — PATIENT HEALTH QUESTIONNAIRE - PHQ9
1. LITTLE INTEREST OR PLEASURE IN DOING THINGS: NOT AT ALL
2. FEELING DOWN, DEPRESSED OR HOPELESS: NOT AT ALL
SUM OF ALL RESPONSES TO PHQ QUESTIONS 1-9: 0

## 2025-04-02 NOTE — PROGRESS NOTES
Beti Gonzales presents today for   Chief Complaint   Patient presents with    New To Provider     Patient is here to establish care with provider. Former [facility] patient.     Medication Refill       Is someone accompanying this pt? yes    Is the patient using any DME equipment during OV? wheelchair    Health Maintenance Due   Topic Date Due    DTaP/Tdap/Td vaccine (1 - Tdap) Never done    Pneumococcal 50+ years Vaccine (1 of 2 - PCV) Never done    Breast cancer screen  Never done    Colorectal Cancer Screen  Never done    Shingles vaccine (1 of 2) Never done    DEXA (modify frequency per FRAX score)  Never done    Respiratory Syncytial Virus (RSV) Pregnant or age 60 yrs+ (1 - Risk 60-74 years 1-dose series) Never done    A1C test (Diabetic or Prediabetic)  10/28/2023    Annual Wellness Visit (Medicare)  11/27/2023    COVID-19 Vaccine (1 - 2024-25 season) Never done    Depression Screen  09/27/2024    Lung Cancer Screening &/or Counseling  12/01/2024    GFR test (Diabetes, CKD 3-4, OR last GFR 15-59)  12/04/2024         \"Have you been to the ER, urgent care clinic since your last visit?  Hospitalized since your last visit?\"    NO    “Have you seen or consulted any other health care providers outside our system since your last visit?”    NO    Have you had a mammogram?”   NO    No breast cancer screening on file       “Have you had a colorectal cancer screening such as a colonoscopy/FIT/Cologuard?    NO    No colonoscopy on file  No cologuard on file  No FIT/FOBT on file   No flexible sigmoidoscopy on file          
daily (Patient not taking: Reported on 4/2/2025) 60 each 3    predniSONE 10 MG (48) TBPK Take as directed on packaging. (Patient not taking: Reported on 4/2/2025) 1 each 0    Respiratory Therapy Supplies (NEBULIZER/TUBING/MOUTHPIECE) KIT 1 kit by Does not apply route daily as needed (sob) (Patient not taking: Reported on 4/2/2025) 1 kit 0    ipratropium 0.5 mg-albuterol 2.5 mg (DUONEB) 0.5-2.5 (3) MG/3ML SOLN nebulizer solution Inhale 3 mLs into the lungs every 4 hours as needed for Shortness of Breath or Wheezing (Patient not taking: Reported on 4/2/2025) 60 each 0     No current facility-administered medications for this visit.        Medical History/Health Concerns:    Review of Systems   Constitutional:  Positive for unexpected weight change (weight gain). Negative for fever.   HENT: Negative.     Eyes: Negative.    Respiratory:  Positive for shortness of breath (chronic) and wheezing. Negative for cough and chest tightness.    Cardiovascular:  Positive for leg swelling (bilateral). Negative for chest pain and palpitations.   Gastrointestinal:  Negative for nausea and vomiting.   Endocrine: Negative for polydipsia, polyphagia and polyuria.   Skin:  Positive for rash (scalp).   Neurological:  Negative for dizziness, syncope and headaches.   Psychiatric/Behavioral:  The patient is nervous/anxious.         Objective:   /86 (BP Site: Right Upper Arm, Patient Position: Sitting, BP Cuff Size: Medium Adult)   Pulse 82   Temp 97.3 °F (36.3 °C) (Oral)   Resp 18   Ht 1.727 m (5' 8\")   SpO2 97%   BMI 34.21 kg/m²       Physical Exam  Vitals and nursing note reviewed.   Constitutional:       General: She is not in acute distress.     Appearance: Normal appearance. She is obese. She is not ill-appearing.      Comments: Sitting upright in wheechair on portable oxygen via [place]   HENT:      Head: Normocephalic and atraumatic.      Nose: Nose normal.      Mouth/Throat:      Mouth: Mucous membranes are moist.

## 2025-06-25 ENCOUNTER — HOSPITAL ENCOUNTER (OUTPATIENT)
Age: 70
Setting detail: SPECIMEN
Discharge: HOME | End: 2025-06-28
Payer: MEDICARE

## 2025-06-25 DIAGNOSIS — I10 PRIMARY HYPERTENSION: ICD-10-CM

## 2025-06-25 DIAGNOSIS — Z79.899 ENCOUNTER FOR LONG-TERM (CURRENT) USE OF MEDICATIONS: ICD-10-CM

## 2025-06-25 DIAGNOSIS — Z13.220 SCREENING FOR LIPID DISORDERS: ICD-10-CM

## 2025-06-25 DIAGNOSIS — R73.03 PREDIABETES: ICD-10-CM

## 2025-06-25 LAB
ALBUMIN SERPL-MCNC: 3 G/DL (ref 3.4–5)
ALBUMIN/GLOB SERPL: 0.8
ALP SERPL-CCNC: 134 U/L (ref 45–117)
ALT SERPL-CCNC: 10 U/L (ref 10–35)
ANION GAP SERPL CALC-SCNC: 12 MMOL/L
AST SERPL W P-5'-P-CCNC: 17 U/L (ref 10–38)
BASOPHILS # BLD: 0.03 K/UL (ref 0–0.1)
BASOPHILS NFR BLD: 0.4 % (ref 0–2)
BILIRUB SERPL-MCNC: 0.4 MG/DL (ref 0.2–1)
BUN SERPL-MCNC: 12 MG/DL (ref 6–23)
BUN/CREAT SERPL: 10
CA-I BLD-MCNC: 9.2 MG/DL (ref 8.5–10.1)
CHLORIDE SERPL-SCNC: 100 MMOL/L (ref 98–107)
CHOLEST SERPL-MCNC: 205 MG/DL
CO2 SERPL-SCNC: 28 MMOL/L (ref 21–32)
CREAT SERPL-MCNC: 1.2 MG/DL (ref 0.6–1.3)
DIFFERENTIAL METHOD BLD: ABNORMAL
EOSINOPHIL # BLD: 0.45 K/UL (ref 0–0.4)
EOSINOPHIL NFR BLD: 5.7 % (ref 0–5)
ERYTHROCYTE [DISTWIDTH] IN BLOOD BY AUTOMATED COUNT: 13.2 % (ref 11.6–14.5)
GLOBULIN SER CALC-MCNC: 3.8 G/DL
GLUCOSE SERPL-MCNC: 182 MG/DL (ref 74–108)
HCT VFR BLD AUTO: 38.7 % (ref 35–45)
HDLC SERPL-MCNC: 45 MG/DL (ref 40–60)
HDLC SERPL: 4.5 (ref 0–5)
HGB BLD-MCNC: 12.1 G/DL (ref 12–16)
IMM GRANULOCYTES # BLD AUTO: 0.06 K/UL (ref 0–0.04)
IMM GRANULOCYTES NFR BLD AUTO: 0.8 % (ref 0–0.5)
LDLC SERPL CALC-MCNC: 142 MG/DL (ref 0–100)
LYMPHOCYTES # BLD: 1.04 K/UL (ref 0.9–3.6)
LYMPHOCYTES NFR BLD: 13.2 % (ref 21–52)
MAGNESIUM SERPL-MCNC: 2 MG/DL (ref 1.6–2.6)
MCH RBC QN AUTO: 29.7 PG (ref 24–34)
MCHC RBC AUTO-ENTMCNC: 31.3 G/DL (ref 31–37)
MCV RBC AUTO: 94.9 FL (ref 78–100)
MONOCYTES # BLD: 0.43 K/UL (ref 0.05–1.2)
MONOCYTES NFR BLD: 5.5 % (ref 3–10)
NEUTS SEG # BLD: 5.85 K/UL (ref 1.8–8)
NEUTS SEG NFR BLD: 74.4 % (ref 40–73)
NRBC # BLD: 0 K/UL (ref 0–0.01)
NRBC BLD-RTO: 0 PER 100 WBC
PLATELET # BLD AUTO: 211 K/UL (ref 135–420)
PMV BLD AUTO: 10.7 FL (ref 9.2–11.8)
POTASSIUM SERPL-SCNC: 3.9 MMOL/L (ref 3.5–5.5)
PROT SERPL-MCNC: 6.8 G/DL (ref 6.4–8.2)
RBC # BLD AUTO: 4.08 M/UL (ref 4.2–5.3)
SODIUM SERPL-SCNC: 140 MMOL/L (ref 136–145)
TRIGL SERPL-MCNC: 90 MG/DL (ref 0–150)
VLDLC SERPL CALC-MCNC: 18 MG/DL
WBC # BLD AUTO: 7.9 K/UL (ref 4.6–13.2)

## 2025-06-25 PROCEDURE — 83735 ASSAY OF MAGNESIUM: CPT

## 2025-06-25 PROCEDURE — 80053 COMPREHEN METABOLIC PANEL: CPT

## 2025-06-25 PROCEDURE — 36415 COLL VENOUS BLD VENIPUNCTURE: CPT

## 2025-06-25 PROCEDURE — 85025 COMPLETE CBC W/AUTO DIFF WBC: CPT

## 2025-06-25 PROCEDURE — 80061 LIPID PANEL: CPT

## 2025-06-27 ENCOUNTER — RESULTS FOLLOW-UP (OUTPATIENT)
Facility: CLINIC | Age: 70
End: 2025-06-27

## 2025-06-27 DIAGNOSIS — I10 PRIMARY HYPERTENSION: ICD-10-CM

## 2025-06-27 RX ORDER — AMLODIPINE BESYLATE 5 MG/1
5 TABLET ORAL DAILY
Qty: 90 TABLET | Refills: 1 | Status: SHIPPED | OUTPATIENT
Start: 2025-06-27

## 2025-06-27 NOTE — TELEPHONE ENCOUNTER
----- Message from PRISCILA Costello sent at 6/27/2025  1:04 PM EDT -----  Call patient: Kidneys stable, liver good, blood sugar 182.  No anemia or white count for infection, magnesium good.  Total cholesterol and triglycerides are good.  LDL cholesterol is mildly elevated 142 but pattern is okay.

## 2025-06-27 NOTE — TELEPHONE ENCOUNTER
Called patient to speak about message from Carol Zavala NP. Unable to reach patient via telephone, left voicemail to call the office back. Will attempt to call again.

## 2025-06-27 NOTE — RESULT ENCOUNTER NOTE
Call patient: Kidneys stable, liver good, blood sugar 182.  No anemia or white count for infection, magnesium good.  Total cholesterol and triglycerides are good.  LDL cholesterol is mildly elevated 142 but pattern is okay.

## 2025-07-25 ENCOUNTER — TELEMEDICINE (OUTPATIENT)
Facility: CLINIC | Age: 70
End: 2025-07-25

## 2025-07-25 DIAGNOSIS — J44.89 ASTHMA-COPD OVERLAP SYNDROME (HCC): Primary | ICD-10-CM

## 2025-07-25 DIAGNOSIS — J96.12 CHRONIC HYPERCAPNIC RESPIRATORY FAILURE (HCC): ICD-10-CM

## 2025-07-25 RX ORDER — ALBUTEROL SULFATE 90 UG/1
2 INHALANT RESPIRATORY (INHALATION) EVERY 6 HOURS PRN
Qty: 18 G | Refills: 3 | Status: SHIPPED | OUTPATIENT
Start: 2025-07-25

## 2025-07-25 RX ORDER — AZITHROMYCIN 500 MG/1
500 TABLET, FILM COATED ORAL DAILY
COMMUNITY
Start: 2025-04-17

## 2025-07-25 ASSESSMENT — ENCOUNTER SYMPTOMS
SHORTNESS OF BREATH: 1
NAUSEA: 0
VOMITING: 0
WHEEZING: 1
DIARRHEA: 0

## 2025-07-25 ASSESSMENT — PATIENT HEALTH QUESTIONNAIRE - PHQ9
SUM OF ALL RESPONSES TO PHQ QUESTIONS 1-9: 0
SUM OF ALL RESPONSES TO PHQ QUESTIONS 1-9: 0
1. LITTLE INTEREST OR PLEASURE IN DOING THINGS: NOT AT ALL
SUM OF ALL RESPONSES TO PHQ QUESTIONS 1-9: 0
DEPRESSION UNABLE TO ASSESS: PT REFUSES
SUM OF ALL RESPONSES TO PHQ QUESTIONS 1-9: 0
2. FEELING DOWN, DEPRESSED OR HOPELESS: NOT AT ALL

## 2025-07-25 NOTE — PROGRESS NOTES
Beti Gonzales, was evaluated through a synchronous (real-time) audio-video encounter. The patient (or guardian if applicable) is aware that this is a billable service, which includes applicable co-pays. This Virtual Visit was conducted with patient's (and/or legal guardian's) consent. Patient identification was verified, and a caregiver was present when appropriate.   The patient was located at Home: [address] [place] [address] [address] [facility]  Provider was located at Facility (Appt Dept): [address] [place] [address]  [place] [id number]  Confirm you are appropriately licensed, registered, or certified to deliver care in the state where the patient is located as indicated above. If you are not or unsure, please re-schedule the visit: Yes, I confirm.     Beti Gonzales (:  1955) is a Established patient, presenting virtually for evaluation of the following:      Below is the assessment and plan developed based on review of pertinent history, physical exam, labs, studies, and medications.     Assessment & Plan  Asthma-COPD overlap syndrome (HCC)   Chronic, worsening (exacerbation), Start Zithromax and prednisone prescribed by pulmonologist. Ventolin prn wheezing, sob.   Orders:    albuterol sulfate HFA (PROVENTIL;VENTOLIN;PROAIR) 108 (90 Base) MCG/ACT inhaler; Inhale 2 puffs into the lungs every 6 hours as needed for Wheezing    Chronic hypercapnic respiratory failure (HCC)   Monitored by specialist- no acute findings meriting change in the plan        Tylenol as needed pain/fever  ER chest pain, shortness of breath, syncope, severe pain  Return in about 3 months (around 10/25/2025), or if symptoms worsen or fail to improve.       Subjective   HPI  Presents today for virtual visit. Was unable to get video to work. Visit today is audio only. Has an appointment with lung doctor on . She spoke with that office and was under the impression that Ventolin inhaler was supposed to be sent in. Had a lose dose lung

## 2025-07-25 NOTE — PROGRESS NOTES
Beti Gonzales presents today for   Chief Complaint   Patient presents with    Discuss Labs    Medication Refill       Is someone accompanying this pt? no    Is the patient using any DME equipment during OV? no    Health Maintenance Due   Topic Date Due    DTaP/Tdap/Td vaccine (1 - Tdap) Never done    Pneumococcal 50+ years Vaccine (1 of 2 - PCV) Never done    Breast cancer screen  Never done    Colorectal Cancer Screen  Never done    Shingles vaccine (1 of 2) Never done    DEXA (modify frequency per FRAX score)  Never done    Respiratory Syncytial Virus (RSV) Pregnant or age 60 yrs+ (1 - Risk 60-74 years 1-dose series) Never done    Annual Wellness Visit (Medicare)  11/27/2023    COVID-19 Vaccine (1 - 2024-25 season) Never done    Lung Cancer Screening &/or Counseling  12/01/2024         \"Have you been to the ER, urgent care clinic since your last visit?  Hospitalized since your last visit?\"    NO    “Have you seen or consulted any other health care providers outside our system since your last visit?”    NO    Have you had a mammogram?”   NO    No breast cancer screening on file       “Have you had a colorectal cancer screening such as a colonoscopy/FIT/Cologuard?    NO    No colonoscopy on file  No cologuard on file  No FIT/FOBT on file   No flexible sigmoidoscopy on file

## 2025-07-25 NOTE — ASSESSMENT & PLAN NOTE
Chronic, worsening (exacerbation), Start Zithromax and prednisone prescribed by pulmonologist. Ventolin prn wheezing, sob.   Orders:    albuterol sulfate HFA (PROVENTIL;VENTOLIN;PROAIR) 108 (90 Base) MCG/ACT inhaler; Inhale 2 puffs into the lungs every 6 hours as needed for Wheezing